# Patient Record
Sex: FEMALE | Race: WHITE | NOT HISPANIC OR LATINO | Employment: FULL TIME | URBAN - METROPOLITAN AREA
[De-identification: names, ages, dates, MRNs, and addresses within clinical notes are randomized per-mention and may not be internally consistent; named-entity substitution may affect disease eponyms.]

---

## 2020-05-05 DIAGNOSIS — Z01.84 ANTIBODY RESPONSE EXAMINATION: ICD-10-CM

## 2020-06-04 DIAGNOSIS — Z01.84 ANTIBODY RESPONSE EXAMINATION: ICD-10-CM

## 2020-07-04 DIAGNOSIS — Z01.84 ANTIBODY RESPONSE EXAMINATION: ICD-10-CM

## 2020-08-03 DIAGNOSIS — Z01.84 ANTIBODY RESPONSE EXAMINATION: ICD-10-CM

## 2020-09-02 DIAGNOSIS — Z01.84 ANTIBODY RESPONSE EXAMINATION: ICD-10-CM

## 2020-10-02 DIAGNOSIS — Z01.84 ANTIBODY RESPONSE EXAMINATION: ICD-10-CM

## 2020-11-01 DIAGNOSIS — Z01.84 ANTIBODY RESPONSE EXAMINATION: ICD-10-CM

## 2020-12-01 DIAGNOSIS — Z01.84 ANTIBODY RESPONSE EXAMINATION: ICD-10-CM

## 2022-10-24 ENCOUNTER — OCCUPATIONAL HEALTH (OUTPATIENT)
Dept: URGENT CARE | Facility: CLINIC | Age: 27
End: 2022-10-24
Payer: MEDICAID

## 2022-10-24 DIAGNOSIS — Z11.59 SCREENING FOR VIRAL DISEASE: Primary | ICD-10-CM

## 2022-10-24 LAB
CTP QC/QA: YES
SARS-COV-2 AG RESP QL IA.RAPID: POSITIVE

## 2022-10-24 PROCEDURE — 87811 SARS-COV-2 COVID19 W/OPTIC: CPT | Mod: QW,S$GLB,, | Performed by: EMERGENCY MEDICINE

## 2022-10-24 PROCEDURE — 87811 SARS CORONAVIRUS 2 ANTIGEN POCT, MANUAL READ: ICD-10-PCS | Mod: QW,S$GLB,, | Performed by: EMERGENCY MEDICINE

## 2022-10-25 PROBLEM — U07.1 COVID-19: Status: ACTIVE | Noted: 2022-10-24

## 2023-02-17 ENCOUNTER — OFFICE VISIT (OUTPATIENT)
Dept: OBSTETRICS AND GYNECOLOGY | Facility: CLINIC | Age: 28
End: 2023-02-17
Attending: OBSTETRICS & GYNECOLOGY
Payer: COMMERCIAL

## 2023-02-17 VITALS
OXYGEN SATURATION: 99 % | RESPIRATION RATE: 18 BRPM | DIASTOLIC BLOOD PRESSURE: 82 MMHG | SYSTOLIC BLOOD PRESSURE: 122 MMHG | HEART RATE: 81 BPM | WEIGHT: 165.81 LBS | BODY MASS INDEX: 30.51 KG/M2 | HEIGHT: 62 IN

## 2023-02-17 DIAGNOSIS — Z01.419 WELL WOMAN EXAM WITH ROUTINE GYNECOLOGICAL EXAM: Primary | ICD-10-CM

## 2023-02-17 DIAGNOSIS — N92.6 IRREGULAR MENSTRUAL CYCLE: ICD-10-CM

## 2023-02-17 DIAGNOSIS — Z12.39 ENCOUNTER FOR BREAST CANCER SCREENING USING NON-MAMMOGRAM MODALITY: ICD-10-CM

## 2023-02-17 DIAGNOSIS — Z12.4 CERVICAL CANCER SCREENING: ICD-10-CM

## 2023-02-17 PROCEDURE — 3008F BODY MASS INDEX DOCD: CPT | Mod: CPTII,S$GLB,, | Performed by: OBSTETRICS & GYNECOLOGY

## 2023-02-17 PROCEDURE — 3074F SYST BP LT 130 MM HG: CPT | Mod: CPTII,S$GLB,, | Performed by: OBSTETRICS & GYNECOLOGY

## 2023-02-17 PROCEDURE — 87624 HPV HI-RISK TYP POOLED RSLT: CPT | Performed by: OBSTETRICS & GYNECOLOGY

## 2023-02-17 PROCEDURE — 3079F PR MOST RECENT DIASTOLIC BLOOD PRESSURE 80-89 MM HG: ICD-10-PCS | Mod: CPTII,S$GLB,, | Performed by: OBSTETRICS & GYNECOLOGY

## 2023-02-17 PROCEDURE — 1159F MED LIST DOCD IN RCRD: CPT | Mod: CPTII,S$GLB,, | Performed by: OBSTETRICS & GYNECOLOGY

## 2023-02-17 PROCEDURE — 3074F PR MOST RECENT SYSTOLIC BLOOD PRESSURE < 130 MM HG: ICD-10-PCS | Mod: CPTII,S$GLB,, | Performed by: OBSTETRICS & GYNECOLOGY

## 2023-02-17 PROCEDURE — 88175 CYTOPATH C/V AUTO FLUID REDO: CPT | Performed by: PATHOLOGY

## 2023-02-17 PROCEDURE — 99385 PREV VISIT NEW AGE 18-39: CPT | Mod: S$GLB,,, | Performed by: OBSTETRICS & GYNECOLOGY

## 2023-02-17 PROCEDURE — 3079F DIAST BP 80-89 MM HG: CPT | Mod: CPTII,S$GLB,, | Performed by: OBSTETRICS & GYNECOLOGY

## 2023-02-17 PROCEDURE — 3008F PR BODY MASS INDEX (BMI) DOCUMENTED: ICD-10-PCS | Mod: CPTII,S$GLB,, | Performed by: OBSTETRICS & GYNECOLOGY

## 2023-02-17 PROCEDURE — 1159F PR MEDICATION LIST DOCUMENTED IN MEDICAL RECORD: ICD-10-PCS | Mod: CPTII,S$GLB,, | Performed by: OBSTETRICS & GYNECOLOGY

## 2023-02-17 PROCEDURE — 99999 PR PBB SHADOW E&M-EST. PATIENT-LVL III: CPT | Mod: PBBFAC,,, | Performed by: OBSTETRICS & GYNECOLOGY

## 2023-02-17 PROCEDURE — 99999 PR PBB SHADOW E&M-EST. PATIENT-LVL III: ICD-10-PCS | Mod: PBBFAC,,, | Performed by: OBSTETRICS & GYNECOLOGY

## 2023-02-17 PROCEDURE — 99385 PR PREVENTIVE VISIT,NEW,18-39: ICD-10-PCS | Mod: S$GLB,,, | Performed by: OBSTETRICS & GYNECOLOGY

## 2023-02-17 NOTE — PROGRESS NOTES
Subjective:    Patient ID: Xuan Hendrix is a 28 y.o. y.o. female.     Chief Complaint: Annual Well Woman Exam     History of Present Illness:  Xuan presents today for Annual Well Woman exam. She describes her menses as regular every month without intermenstrual spotting.She denies pelvic pain.  She denies breast tenderness, masses, nipple discharge. She denies difficulty with urination or bowel movements. She is sexually active. Contraception is by NFP.    Patient previously on OCPs and stopped ~ 1 year ago. She is doing NFP to prevent pregnancy at this time. Last 6 cycles have ranged from 26-38 days. She reports doing OPKs on CD10-13 with no positive but had a change in d/c on CD#14. She is interested in conceiving soon.     The following portions of the patient's history were reviewed and updated as appropriate: allergies, current medications, past family history, past medical history, past social history, past surgical history and problem list.      Menstrual History:   Patient's last menstrual period was 02/03/2023 (exact date)..     OB History    No obstetric history on file.         The following portions of the patient's history were reviewed and updated as appropriate: allergies, current medications, past family history, past medical history, past social history, past surgical history, and problem list.        ROS:     Review of Systems   Constitutional:  Negative for activity change, appetite change, chills, diaphoresis, fatigue, fever and unexpected weight change.   HENT:  Negative for mouth sores and tinnitus.    Eyes:  Negative for discharge and visual disturbance.   Respiratory:  Negative for cough, shortness of breath and wheezing.    Cardiovascular:  Negative for chest pain, palpitations and leg swelling.   Gastrointestinal:  Negative for abdominal pain, bloating, blood in stool, constipation, diarrhea, nausea and vomiting.   Endocrine: Negative for diabetes, hair loss, hot flashes,  "hyperthyroidism and hypothyroidism.   Genitourinary:  Positive for menstrual problem. Negative for dysuria, flank pain, frequency, genital sores, hematuria, urgency, vaginal bleeding, vaginal discharge, vaginal pain, postcoital bleeding and vaginal odor.   Musculoskeletal:  Negative for back pain, joint swelling and myalgias.   Integumentary:  Negative for rash, acne, breast mass, nipple discharge and breast skin changes.   Neurological:  Negative for seizures, syncope, numbness and headaches.   Hematological:  Negative for adenopathy. Does not bruise/bleed easily.   Psychiatric/Behavioral:  Negative for depression and sleep disturbance. The patient is not nervous/anxious.    Breast: Negative for mass, mastodynia, nipple discharge and skin changes    Objective:    Vital Signs:  Vitals:    02/17/23 1124   BP: 122/82   Pulse: 81   Resp: 18   SpO2: 99%   Weight: 75.2 kg (165 lb 12.8 oz)   Height: 5' 2" (1.575 m)         Physical Exam:  General:  alert, cooperative, appears stated age   Skin:  Skin color, texture, turgor normal. No rashes or lesions   HEENT:  conjunctivae/corneas clear. PERRL.   Neck: supple, trachea midline, no adenopathy or thyromegally   Respiratory:  clear to auscultation bilaterally   Heart:  regular rate and rhythm, S1, S2 normal, no murmur, click, rub or gallop   Breasts:  no discharge, erythema, or tenderness   Abdomen:  normal findings: bowel sounds normal, no masses palpable, no organomegaly and soft, non-tender   Pelvis: External genitalia: normal general appearance  Urinary system: urethral meatus normal, bladder nontender  Vaginal: normal mucosa without prolapse or lesions  Cervix: normal appearance  Uterus: normal size, shape, position  Adnexa: normal size, nontender bilaterally   Extremities: Normal ROM; no edema, no cyanosis   Neurologial: Normal strength and tone. No focal numbness or weakness. Reflexes 2+ and equal.   Psychiatric: normal mood, speech, dress, and thought processes "         Assessment:       Healthy female exam.     1. Well woman exam with routine gynecological exam    2. Encounter for breast cancer screening using non-mammogram modality    3. Cervical cancer screening    4. Irregular menstrual cycle          Plan:       Thin prep Pap smear.  HPV cotesting  CD 21 labs on 2/24     COUNSELING:  Xuan was counseled on STD pevention, use and side-effects of various contraceptive measures, A.C.O.G. Pap guidelines and recommendations for yearly pelvic exams in addition to recommendations for monthly self breast exams; to see her PCP for other health maintenance.

## 2023-02-25 ENCOUNTER — LAB VISIT (OUTPATIENT)
Dept: LAB | Facility: HOSPITAL | Age: 28
End: 2023-02-25
Attending: OBSTETRICS & GYNECOLOGY
Payer: COMMERCIAL

## 2023-02-25 DIAGNOSIS — N92.6 IRREGULAR MENSTRUAL CYCLE: ICD-10-CM

## 2023-02-25 LAB
ESTIMATED AVG GLUCOSE: 91 MG/DL (ref 68–131)
HBA1C MFR BLD: 4.8 % (ref 4–5.6)
PROGEST SERPL-MCNC: 4.8 NG/ML
PROLACTIN SERPL IA-MCNC: 21.1 NG/ML (ref 5.2–26.5)
TSH SERPL DL<=0.005 MIU/L-ACNC: 2.96 UIU/ML (ref 0.4–4)

## 2023-02-25 PROCEDURE — 36415 COLL VENOUS BLD VENIPUNCTURE: CPT | Performed by: OBSTETRICS & GYNECOLOGY

## 2023-02-25 PROCEDURE — 84443 ASSAY THYROID STIM HORMONE: CPT | Performed by: OBSTETRICS & GYNECOLOGY

## 2023-02-25 PROCEDURE — 83036 HEMOGLOBIN GLYCOSYLATED A1C: CPT | Performed by: OBSTETRICS & GYNECOLOGY

## 2023-02-25 PROCEDURE — 84144 ASSAY OF PROGESTERONE: CPT | Performed by: OBSTETRICS & GYNECOLOGY

## 2023-02-25 PROCEDURE — 84146 ASSAY OF PROLACTIN: CPT | Performed by: OBSTETRICS & GYNECOLOGY

## 2023-02-27 ENCOUNTER — PATIENT MESSAGE (OUTPATIENT)
Dept: OBSTETRICS AND GYNECOLOGY | Facility: CLINIC | Age: 28
End: 2023-02-27
Payer: COMMERCIAL

## 2023-02-27 LAB
HPV HR 12 DNA SPEC QL NAA+PROBE: POSITIVE
HPV16 AG SPEC QL: NEGATIVE
HPV18 DNA SPEC QL NAA+PROBE: NEGATIVE

## 2023-02-28 LAB
FINAL PATHOLOGIC DIAGNOSIS: ABNORMAL
Lab: ABNORMAL

## 2023-03-03 DIAGNOSIS — N97.0 ANOVULATION: Primary | ICD-10-CM

## 2023-03-03 RX ORDER — MEDROXYPROGESTERONE ACETATE 10 MG/1
10 TABLET ORAL DAILY
Qty: 10 TABLET | Refills: 0 | Status: SHIPPED | OUTPATIENT
Start: 2023-03-03 | End: 2023-05-17

## 2023-03-03 RX ORDER — LETROZOLE 2.5 MG/1
2.5 TABLET, FILM COATED ORAL DAILY
Qty: 5 TABLET | Refills: 0 | Status: SHIPPED | OUTPATIENT
Start: 2023-03-03 | End: 2023-03-08

## 2023-03-16 ENCOUNTER — PROCEDURE VISIT (OUTPATIENT)
Dept: OBSTETRICS AND GYNECOLOGY | Facility: CLINIC | Age: 28
End: 2023-03-16
Payer: COMMERCIAL

## 2023-03-16 VITALS
DIASTOLIC BLOOD PRESSURE: 78 MMHG | HEIGHT: 62 IN | BODY MASS INDEX: 30.67 KG/M2 | SYSTOLIC BLOOD PRESSURE: 124 MMHG | WEIGHT: 166.69 LBS

## 2023-03-16 DIAGNOSIS — Z23 NEED FOR HPV VACCINATION: ICD-10-CM

## 2023-03-16 DIAGNOSIS — R87.612 LGSIL ON PAP SMEAR OF CERVIX: ICD-10-CM

## 2023-03-16 DIAGNOSIS — Z01.812 ENCOUNTER FOR PREPROCEDURAL LABORATORY EXAMINATION: Primary | ICD-10-CM

## 2023-03-16 LAB
B-HCG UR QL: NEGATIVE
CTP QC/QA: YES

## 2023-03-16 PROCEDURE — 56820 COLPOSCOPY: ICD-10-PCS | Mod: S$GLB,,, | Performed by: OBSTETRICS & GYNECOLOGY

## 2023-03-16 PROCEDURE — 88305 TISSUE EXAM BY PATHOLOGIST: ICD-10-PCS | Mod: 26,,, | Performed by: PATHOLOGY

## 2023-03-16 PROCEDURE — 81025 POCT URINE PREGNANCY: ICD-10-PCS | Mod: S$GLB,,, | Performed by: OBSTETRICS & GYNECOLOGY

## 2023-03-16 PROCEDURE — 88342 IMHCHEM/IMCYTCHM 1ST ANTB: CPT | Mod: 26,,, | Performed by: PATHOLOGY

## 2023-03-16 PROCEDURE — 90651 HPV VACCINE 9-VALENT 3 DOSE IM: ICD-10-PCS | Mod: S$GLB,,, | Performed by: OBSTETRICS & GYNECOLOGY

## 2023-03-16 PROCEDURE — 56820 COLPOSCOPY VULVA: CPT | Mod: S$GLB,,, | Performed by: OBSTETRICS & GYNECOLOGY

## 2023-03-16 PROCEDURE — 90471 HPV VACCINE 9-VALENT 3 DOSE IM: ICD-10-PCS | Mod: S$GLB,,, | Performed by: OBSTETRICS & GYNECOLOGY

## 2023-03-16 PROCEDURE — 90651 9VHPV VACCINE 2/3 DOSE IM: CPT | Mod: S$GLB,,, | Performed by: OBSTETRICS & GYNECOLOGY

## 2023-03-16 PROCEDURE — 88342 IMHCHEM/IMCYTCHM 1ST ANTB: CPT | Performed by: PATHOLOGY

## 2023-03-16 PROCEDURE — 81025 URINE PREGNANCY TEST: CPT | Mod: S$GLB,,, | Performed by: OBSTETRICS & GYNECOLOGY

## 2023-03-16 PROCEDURE — 88342 CHG IMMUNOCYTOCHEMISTRY: ICD-10-PCS | Mod: 26,,, | Performed by: PATHOLOGY

## 2023-03-16 PROCEDURE — 88305 TISSUE EXAM BY PATHOLOGIST: CPT | Mod: 26,,, | Performed by: PATHOLOGY

## 2023-03-16 PROCEDURE — 88305 TISSUE EXAM BY PATHOLOGIST: CPT | Mod: 59 | Performed by: PATHOLOGY

## 2023-03-16 PROCEDURE — 90471 IMMUNIZATION ADMIN: CPT | Mod: S$GLB,,, | Performed by: OBSTETRICS & GYNECOLOGY

## 2023-03-16 NOTE — PROCEDURES
Colposcopy    Date/Time: 3/16/2023 2:00 PM  Performed by: Estella Pino MD  Authorized by: Estella Pino MD     Consent Done?:  Yes (Verbal) and Yes (Written)  Assistants?: Yes      Colposcopy Site:  Cervix, Vagina and Vulva  Position:  Supine  Acrowhite Lesion: No    Atypical Vessels: No    Transformation Zone Adequate?: No    Biopsy?: Yes         Location:  Cervix ((2 00 and 5 00))  ECC Performed?: Yes     Patient tolerated the procedure well with no immediate complications.

## 2023-03-27 LAB
FINAL PATHOLOGIC DIAGNOSIS: NORMAL
GROSS: NORMAL
Lab: NORMAL

## 2023-05-01 ENCOUNTER — CLINICAL SUPPORT (OUTPATIENT)
Dept: FAMILY MEDICINE | Facility: CLINIC | Age: 28
End: 2023-05-01
Payer: COMMERCIAL

## 2023-05-01 DIAGNOSIS — N97.0 ANOVULATION: ICD-10-CM

## 2023-05-01 LAB — PROGEST SERPL-MCNC: 16.1 NG/ML

## 2023-05-01 PROCEDURE — 84144 ASSAY OF PROGESTERONE: CPT | Performed by: OBSTETRICS & GYNECOLOGY

## 2023-05-03 ENCOUNTER — PATIENT MESSAGE (OUTPATIENT)
Dept: OBSTETRICS AND GYNECOLOGY | Facility: CLINIC | Age: 28
End: 2023-05-03
Payer: COMMERCIAL

## 2023-05-03 ENCOUNTER — OFFICE VISIT (OUTPATIENT)
Dept: INTERNAL MEDICINE | Facility: CLINIC | Age: 28
End: 2023-05-03
Payer: COMMERCIAL

## 2023-05-03 VITALS
SYSTOLIC BLOOD PRESSURE: 122 MMHG | WEIGHT: 168.19 LBS | DIASTOLIC BLOOD PRESSURE: 70 MMHG | HEART RATE: 83 BPM | HEIGHT: 62 IN | OXYGEN SATURATION: 100 % | RESPIRATION RATE: 16 BRPM | BODY MASS INDEX: 30.95 KG/M2

## 2023-05-03 DIAGNOSIS — Z32.01 POSITIVE URINE PREGNANCY TEST: Primary | ICD-10-CM

## 2023-05-03 DIAGNOSIS — Z11.4 ENCOUNTER FOR SCREENING FOR HIV: ICD-10-CM

## 2023-05-03 DIAGNOSIS — F90.2 ADHD (ATTENTION DEFICIT HYPERACTIVITY DISORDER), COMBINED TYPE: ICD-10-CM

## 2023-05-03 DIAGNOSIS — Z76.89 ENCOUNTER TO ESTABLISH CARE: Primary | ICD-10-CM

## 2023-05-03 DIAGNOSIS — E66.09 CLASS 1 OBESITY DUE TO EXCESS CALORIES WITHOUT SERIOUS COMORBIDITY WITH BODY MASS INDEX (BMI) OF 30.0 TO 30.9 IN ADULT: ICD-10-CM

## 2023-05-03 DIAGNOSIS — Z11.59 NEED FOR HEPATITIS C SCREENING TEST: ICD-10-CM

## 2023-05-03 PROCEDURE — 1160F PR REVIEW ALL MEDS BY PRESCRIBER/CLIN PHARMACIST DOCUMENTED: ICD-10-PCS | Mod: CPTII,S$GLB,, | Performed by: INTERNAL MEDICINE

## 2023-05-03 PROCEDURE — 1159F MED LIST DOCD IN RCRD: CPT | Mod: CPTII,S$GLB,, | Performed by: INTERNAL MEDICINE

## 2023-05-03 PROCEDURE — 3078F DIAST BP <80 MM HG: CPT | Mod: CPTII,S$GLB,, | Performed by: INTERNAL MEDICINE

## 2023-05-03 PROCEDURE — 99999 PR PBB SHADOW E&M-EST. PATIENT-LVL III: ICD-10-PCS | Mod: PBBFAC,,, | Performed by: INTERNAL MEDICINE

## 2023-05-03 PROCEDURE — 1160F RVW MEDS BY RX/DR IN RCRD: CPT | Mod: CPTII,S$GLB,, | Performed by: INTERNAL MEDICINE

## 2023-05-03 PROCEDURE — 3044F HG A1C LEVEL LT 7.0%: CPT | Mod: CPTII,S$GLB,, | Performed by: INTERNAL MEDICINE

## 2023-05-03 PROCEDURE — 3074F SYST BP LT 130 MM HG: CPT | Mod: CPTII,S$GLB,, | Performed by: INTERNAL MEDICINE

## 2023-05-03 PROCEDURE — 3044F PR MOST RECENT HEMOGLOBIN A1C LEVEL <7.0%: ICD-10-PCS | Mod: CPTII,S$GLB,, | Performed by: INTERNAL MEDICINE

## 2023-05-03 PROCEDURE — 1159F PR MEDICATION LIST DOCUMENTED IN MEDICAL RECORD: ICD-10-PCS | Mod: CPTII,S$GLB,, | Performed by: INTERNAL MEDICINE

## 2023-05-03 PROCEDURE — 99204 PR OFFICE/OUTPT VISIT, NEW, LEVL IV, 45-59 MIN: ICD-10-PCS | Mod: S$GLB,,, | Performed by: INTERNAL MEDICINE

## 2023-05-03 PROCEDURE — 99999 PR PBB SHADOW E&M-EST. PATIENT-LVL III: CPT | Mod: PBBFAC,,, | Performed by: INTERNAL MEDICINE

## 2023-05-03 PROCEDURE — 3078F PR MOST RECENT DIASTOLIC BLOOD PRESSURE < 80 MM HG: ICD-10-PCS | Mod: CPTII,S$GLB,, | Performed by: INTERNAL MEDICINE

## 2023-05-03 PROCEDURE — 3074F PR MOST RECENT SYSTOLIC BLOOD PRESSURE < 130 MM HG: ICD-10-PCS | Mod: CPTII,S$GLB,, | Performed by: INTERNAL MEDICINE

## 2023-05-03 PROCEDURE — 99204 OFFICE O/P NEW MOD 45 MIN: CPT | Mod: S$GLB,,, | Performed by: INTERNAL MEDICINE

## 2023-05-03 PROCEDURE — 3008F BODY MASS INDEX DOCD: CPT | Mod: CPTII,S$GLB,, | Performed by: INTERNAL MEDICINE

## 2023-05-03 PROCEDURE — 3008F PR BODY MASS INDEX (BMI) DOCUMENTED: ICD-10-PCS | Mod: CPTII,S$GLB,, | Performed by: INTERNAL MEDICINE

## 2023-05-03 NOTE — PROGRESS NOTES
Subjective:       Patient ID: Xuan Quintanilla is a 28 y.o. female.    Chief Complaint: Establish Care      HPI:  Patient is new to clinic and presents to establish care. She has ADHD, anxiety/OCD. She is recently employed in [employer] system and needs to establish care with PCP here.     She established with Dr. Ventura here for routine GYN here. She had abnormal PAP; Colposcopy recently completed.  She is considering pregnancy and had labs with Dr. Ventura as well following her progesterone.     ADHD and anxiety: she is on Zoloft 25mg and Adderall 10mg---script written for BID but she and her prior PCP had discussed and she is taking once daily as afternoon dose was causing insomnia. Scripts labs about 2 months. Sometimes skips weekend doses as well. She has never tried stopping her stimulant completely but does note when she skips weekend her concentration is very poor.     Tobacco use: denies  EtOH use: no daily use  Illicit drug use: denies          Past Medical History:   Diagnosis Date    ADHD (attention deficit hyperactivity disorder), combined type 03/2018    Jasper Goldberg 81    Adolescent idiopathic scoliosis of thoracolumbar region     Agoraphobia with panic attacks     Anxiety hyperventilation     Chronic seasonal allergic rhinitis     COVID-19 10/24/2022    Hx of infectious mononucleosis     Obsessive compulsive disorder     PCR DNA positive for HSV1        Family History   Problem Relation Age of Onset    Hypertension Mother     Miscarriages / Stillbirths Mother     Obesity Mother     Anxiety disorder Mother     Metabolic syndrome Mother     ADD / ADHD Mother     Polycystic ovary syndrome Mother     Endometriosis Mother     Blindness Father         right eye    Heart attack Maternal Uncle     Stroke Maternal Uncle     Heart disease Maternal Grandmother     Arthritis Maternal Grandmother     Cancer Maternal Grandfather     Heart attack Maternal Grandfather     Breast cancer Neg Hx     Colon cancer Neg Hx      Diabetes Neg Hx        Social History     Socioeconomic History    Marital status:      Spouse name: Alf Keller    Number of children: 0    Highest education level: Some college, no degree   Occupational History    Occupation: Student at [employer], 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   HENT:  Negative for congestion, ear pain, hearing loss, rhinorrhea, sore throat and tinnitus.    Eyes:  Negative for pain, redness and visual disturbance.   Respiratory:  Negative for cough, shortness of breath and wheezing.    Cardiovascular:  Negative for chest pain, palpitations and leg swelling.   Gastrointestinal:  Negative for abdominal pain, blood in stool, constipation, diarrhea, nausea and vomiting.   Genitourinary:  Negative for dysuria, frequency, pelvic pain and urgency.   Musculoskeletal:  Negative for back pain, joint swelling and neck pain.   Skin:  Negative for color change, rash and wound.   Neurological:  Negative for dizziness, tremors, weakness, light-headedness and headaches.       Objective:      Physical Exam  Vitals reviewed.   Constitutional:       General: She is not in acute distress.     Appearance: She is well-developed.   HENT:      Head: Normocephalic and atraumatic.      Right Ear: External ear normal.      Left Ear: External ear normal.      Nose: Nose normal.   Eyes:      General:         Right eye: No discharge.         Left eye: No discharge.      Extraocular Movements: Extraocular movements intact.      Conjunctiva/sclera: Conjunctivae normal.   Neck:       Thyroid: No thyromegaly.   Cardiovascular:      Rate and Rhythm: Normal rate and regular rhythm.   Pulmonary:      Effort: Pulmonary effort is normal.   Skin:     General: Skin is warm and dry.   Neurological:      Mental Status: She is alert and oriented to person, place, and time. Mental status is at baseline.   Psychiatric:         Behavior: Behavior normal.         Thought Content: Thought content normal.       Assessment:       1. Encounter to establish care    2. ADHD (attention deficit hyperactivity disorder), combined type    3. Class 1 obesity due to excess calories without serious comorbidity with body mass index (BMI) of 30.0 to 30.9 in adult    4. Encounter for screening for HIV    5. Need for hepatitis C screening test        Plan:       1. Encounter to establish care  Meds reconciled  Problem list reviewed and updated    2. ADHD (attention deficit hyperactivity disorder), combined type  Chronic stable  Cont adderall and zoloft same dose    Agree to monthly refill, no early refills. We discussed her current dosing and #60 tabs should last 2 months on refills  agrees to 1 pharmacy- [facility]  I will monitor   Agrees only 1 provider can prescribed controlled substances. Understands if other providers prescribe for acute issue she must let me know  Agrees to random UDS and understands if ever positive, including THC will no longer prescribe  Understands I reserve the risk to stop prescribing controlled substances at any time    Pregnancy counseling regarding stimulant medications done today as well. We will have to stop during pregnancy. OK to cont zoloft in pregnancy. I spent > 15 mins on counseling today     3. Class 1 obesity due to excess calories without serious comorbidity with body mass index (BMI) of 30.0 to 30.9 in adult  Diet, exercise, weight loss  Update routine age recommended screening labs  -     CBC Auto Differential; Future; Expected date: 05/03/2023  -     Comprehensive Metabolic Panel;  Future; Expected date: 05/03/2023  -     Lipid Panel; Future; Expected date: 05/03/2023    4. Encounter for screening for HIV  Agreeable to screening. 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 Need for hepatitis C screening test  Agreeable to screening. 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

## 2023-05-03 NOTE — TELEPHONE ENCOUNTER
Pt received progesterone results on portal and is s/p femara 2.5 mg. Results copied and pasted below.     Component Ref Range & Units 2 d ago 2 mo ago   Progesterone See Text ng/mL 16.1  4.8 CM    Comment: Female Reference Ranges:   Follicular phase...............<0.3 ng/mL   Luteal phase...................1.2-15.9 ng/mL   Post-menopausal................<0.2 ng/mL   Pregnant, 1st Trimester........2.8-147.3 ng/mL   Pregnant, 2nd Trimester........22.5-95.3 ng/mL   Pregnant, 3rd Trimester........27.9-243 ng/mL   Male Reference range...........<0.2 ng/mL    Resulting Agency  [facility]              Specimen Collected: 05/01/23 11:37 Last Resulted: 05/01/23 23:01

## 2023-05-12 ENCOUNTER — CLINICAL SUPPORT (OUTPATIENT)
Dept: INTERNAL MEDICINE | Facility: CLINIC | Age: 28
End: 2023-05-12
Payer: COMMERCIAL

## 2023-05-12 DIAGNOSIS — E66.09 CLASS 1 OBESITY DUE TO EXCESS CALORIES WITHOUT SERIOUS COMORBIDITY WITH BODY MASS INDEX (BMI) OF 30.0 TO 30.9 IN ADULT: ICD-10-CM

## 2023-05-12 DIAGNOSIS — Z32.01 POSITIVE URINE PREGNANCY TEST: ICD-10-CM

## 2023-05-12 DIAGNOSIS — Z11.4 ENCOUNTER FOR SCREENING FOR HIV: ICD-10-CM

## 2023-05-12 DIAGNOSIS — Z11.59 NEED FOR HEPATITIS C SCREENING TEST: ICD-10-CM

## 2023-05-12 LAB
ALBUMIN SERPL BCP-MCNC: 4.2 G/DL (ref 3.5–5.2)
ALP SERPL-CCNC: 50 U/L (ref 55–135)
ALT SERPL W/O P-5'-P-CCNC: 19 U/L (ref 10–44)
ANION GAP SERPL CALC-SCNC: 6 MMOL/L (ref 8–16)
AST SERPL-CCNC: 23 U/L (ref 10–40)
BASOPHILS # BLD AUTO: 0.09 K/UL (ref 0–0.2)
BASOPHILS NFR BLD: 1.1 % (ref 0–1.9)
BILIRUB SERPL-MCNC: 0.3 MG/DL (ref 0.1–1)
BUN SERPL-MCNC: 9 MG/DL (ref 6–20)
CALCIUM SERPL-MCNC: 9.9 MG/DL (ref 8.7–10.5)
CHLORIDE SERPL-SCNC: 105 MMOL/L (ref 95–110)
CHOLEST SERPL-MCNC: 214 MG/DL (ref 120–199)
CHOLEST/HDLC SERPL: 2.7 {RATIO} (ref 2–5)
CO2 SERPL-SCNC: 25 MMOL/L (ref 23–29)
CREAT SERPL-MCNC: 0.8 MG/DL (ref 0.5–1.4)
DIFFERENTIAL METHOD: ABNORMAL
EOSINOPHIL # BLD AUTO: 0.2 K/UL (ref 0–0.5)
EOSINOPHIL NFR BLD: 2.8 % (ref 0–8)
ERYTHROCYTE [DISTWIDTH] IN BLOOD BY AUTOMATED COUNT: 12.4 % (ref 11.5–14.5)
EST. GFR  (NO RACE VARIABLE): >60 ML/MIN/1.73 M^2
GLUCOSE SERPL-MCNC: 84 MG/DL (ref 70–110)
HCG INTACT+B SERPL-ACNC: 1263 MIU/ML
HCT VFR BLD AUTO: 41.5 % (ref 37–48.5)
HCV AB SERPL QL IA: NORMAL
HDLC SERPL-MCNC: 80 MG/DL (ref 40–75)
HDLC SERPL: 37.4 % (ref 20–50)
HGB BLD-MCNC: 13.2 G/DL (ref 12–16)
HIV 1+2 AB+HIV1 P24 AG SERPL QL IA: NORMAL
IMM GRANULOCYTES # BLD AUTO: 0.03 K/UL (ref 0–0.04)
IMM GRANULOCYTES NFR BLD AUTO: 0.4 % (ref 0–0.5)
LDLC SERPL CALC-MCNC: 123.6 MG/DL (ref 63–159)
LYMPHOCYTES # BLD AUTO: 2.4 K/UL (ref 1–4.8)
LYMPHOCYTES NFR BLD: 28.5 % (ref 18–48)
MCH RBC QN AUTO: 28.6 PG (ref 27–31)
MCHC RBC AUTO-ENTMCNC: 31.8 G/DL (ref 32–36)
MCV RBC AUTO: 90 FL (ref 82–98)
MONOCYTES # BLD AUTO: 0.5 K/UL (ref 0.3–1)
MONOCYTES NFR BLD: 6.5 % (ref 4–15)
NEUTROPHILS # BLD AUTO: 5.1 K/UL (ref 1.8–7.7)
NEUTROPHILS NFR BLD: 60.7 % (ref 38–73)
NONHDLC SERPL-MCNC: 134 MG/DL
NRBC BLD-RTO: 0 /100 WBC
PLATELET # BLD AUTO: 259 K/UL (ref 150–450)
PMV BLD AUTO: 10.8 FL (ref 9.2–12.9)
POTASSIUM SERPL-SCNC: 3.6 MMOL/L (ref 3.5–5.1)
PROT SERPL-MCNC: 7.6 G/DL (ref 6–8.4)
RBC # BLD AUTO: 4.61 M/UL (ref 4–5.4)
SODIUM SERPL-SCNC: 136 MMOL/L (ref 136–145)
TRIGL SERPL-MCNC: 52 MG/DL (ref 30–150)
WBC # BLD AUTO: 8.32 K/UL (ref 3.9–12.7)

## 2023-05-12 PROCEDURE — 80061 LIPID PANEL: CPT | Performed by: INTERNAL MEDICINE

## 2023-05-12 PROCEDURE — 86803 HEPATITIS C AB TEST: CPT | Performed by: INTERNAL MEDICINE

## 2023-05-12 PROCEDURE — 36415 PR COLLECTION VENOUS BLOOD,VENIPUNCTURE: ICD-10-PCS | Mod: S$GLB,,, | Performed by: INTERNAL MEDICINE

## 2023-05-12 PROCEDURE — 80053 COMPREHEN METABOLIC PANEL: CPT | Performed by: INTERNAL MEDICINE

## 2023-05-12 PROCEDURE — 36415 COLL VENOUS BLD VENIPUNCTURE: CPT | Performed by: INTERNAL MEDICINE

## 2023-05-12 PROCEDURE — 84702 CHORIONIC GONADOTROPIN TEST: CPT | Performed by: OBSTETRICS & GYNECOLOGY

## 2023-05-12 PROCEDURE — 85025 COMPLETE CBC W/AUTO DIFF WBC: CPT | Performed by: INTERNAL MEDICINE

## 2023-05-12 PROCEDURE — 87389 HIV-1 AG W/HIV-1&-2 AB AG IA: CPT | Performed by: INTERNAL MEDICINE

## 2023-05-12 PROCEDURE — 36415 COLL VENOUS BLD VENIPUNCTURE: CPT | Mod: S$GLB,,, | Performed by: INTERNAL MEDICINE

## 2023-05-15 ENCOUNTER — PATIENT MESSAGE (OUTPATIENT)
Dept: OBSTETRICS AND GYNECOLOGY | Facility: CLINIC | Age: 28
End: 2023-05-15
Payer: COMMERCIAL

## 2023-05-16 ENCOUNTER — LAB VISIT (OUTPATIENT)
Dept: LAB | Facility: HOSPITAL | Age: 28
End: 2023-05-16
Attending: OBSTETRICS & GYNECOLOGY
Payer: COMMERCIAL

## 2023-05-16 DIAGNOSIS — Z32.01 POSITIVE URINE PREGNANCY TEST: ICD-10-CM

## 2023-05-16 DIAGNOSIS — Z32.01 POSITIVE PREGNANCY TEST: ICD-10-CM

## 2023-05-16 DIAGNOSIS — Z32.01 POSITIVE PREGNANCY TEST: Primary | ICD-10-CM

## 2023-05-16 LAB
ABO + RH BLD: NORMAL
BLD GP AB SCN CELLS X3 SERPL QL: NORMAL
HBV SURFACE AG SERPL QL IA: NORMAL
HCG INTACT+B SERPL-ACNC: 9992 MIU/ML
SPECIMEN OUTDATE: NORMAL

## 2023-05-16 PROCEDURE — 36415 COLL VENOUS BLD VENIPUNCTURE: CPT | Performed by: OBSTETRICS & GYNECOLOGY

## 2023-05-16 PROCEDURE — 87340 HEPATITIS B SURFACE AG IA: CPT | Performed by: OBSTETRICS & GYNECOLOGY

## 2023-05-16 PROCEDURE — 86762 RUBELLA ANTIBODY: CPT | Performed by: OBSTETRICS & GYNECOLOGY

## 2023-05-16 PROCEDURE — 84702 CHORIONIC GONADOTROPIN TEST: CPT | Performed by: OBSTETRICS & GYNECOLOGY

## 2023-05-16 PROCEDURE — 81220 CFTR GENE COM VARIANTS: CPT | Performed by: OBSTETRICS & GYNECOLOGY

## 2023-05-16 PROCEDURE — 86592 SYPHILIS TEST NON-TREP QUAL: CPT | Performed by: OBSTETRICS & GYNECOLOGY

## 2023-05-16 PROCEDURE — 86900 BLOOD TYPING SEROLOGIC ABO: CPT | Performed by: OBSTETRICS & GYNECOLOGY

## 2023-05-17 ENCOUNTER — OFFICE VISIT (OUTPATIENT)
Dept: OBSTETRICS AND GYNECOLOGY | Facility: CLINIC | Age: 28
End: 2023-05-17
Payer: COMMERCIAL

## 2023-05-17 VITALS
HEIGHT: 62 IN | DIASTOLIC BLOOD PRESSURE: 72 MMHG | SYSTOLIC BLOOD PRESSURE: 114 MMHG | RESPIRATION RATE: 18 BRPM | HEART RATE: 91 BPM | BODY MASS INDEX: 31.34 KG/M2 | WEIGHT: 170.31 LBS | OXYGEN SATURATION: 98 %

## 2023-05-17 DIAGNOSIS — Z32.01 POSITIVE PREGNANCY TEST: ICD-10-CM

## 2023-05-17 DIAGNOSIS — N91.2 AMENORRHEA: Primary | ICD-10-CM

## 2023-05-17 DIAGNOSIS — Z11.3 SCREEN FOR STD (SEXUALLY TRANSMITTED DISEASE): ICD-10-CM

## 2023-05-17 DIAGNOSIS — Z32.00 POSSIBLE PREGNANCY: ICD-10-CM

## 2023-05-17 LAB
B-HCG UR QL: POSITIVE
C TRACH DNA SPEC QL NAA+PROBE: NOT DETECTED
CTP QC/QA: YES
N GONORRHOEA DNA SPEC QL NAA+PROBE: NOT DETECTED
RPR SER QL: NORMAL
RUBV IGG SER-ACNC: 29.1 IU/ML
RUBV IGG SER-IMP: REACTIVE

## 2023-05-17 PROCEDURE — 3044F HG A1C LEVEL LT 7.0%: CPT | Mod: CPTII,S$GLB,, | Performed by: OBSTETRICS & GYNECOLOGY

## 2023-05-17 PROCEDURE — 3075F PR MOST RECENT SYSTOLIC BLOOD PRESS GE 130-139MM HG: ICD-10-PCS | Mod: CPTII,S$GLB,, | Performed by: OBSTETRICS & GYNECOLOGY

## 2023-05-17 PROCEDURE — 1159F MED LIST DOCD IN RCRD: CPT | Mod: CPTII,S$GLB,, | Performed by: OBSTETRICS & GYNECOLOGY

## 2023-05-17 PROCEDURE — 87591 N.GONORRHOEAE DNA AMP PROB: CPT | Performed by: OBSTETRICS & GYNECOLOGY

## 2023-05-17 PROCEDURE — 3078F PR MOST RECENT DIASTOLIC BLOOD PRESSURE < 80 MM HG: ICD-10-PCS | Mod: CPTII,S$GLB,, | Performed by: OBSTETRICS & GYNECOLOGY

## 2023-05-17 PROCEDURE — 81025 POCT URINE PREGNANCY: ICD-10-PCS | Mod: S$GLB,,, | Performed by: OBSTETRICS & GYNECOLOGY

## 2023-05-17 PROCEDURE — 3078F DIAST BP <80 MM HG: CPT | Mod: CPTII,S$GLB,, | Performed by: OBSTETRICS & GYNECOLOGY

## 2023-05-17 PROCEDURE — 3075F SYST BP GE 130 - 139MM HG: CPT | Mod: CPTII,S$GLB,, | Performed by: OBSTETRICS & GYNECOLOGY

## 2023-05-17 PROCEDURE — 81025 URINE PREGNANCY TEST: CPT | Mod: S$GLB,,, | Performed by: OBSTETRICS & GYNECOLOGY

## 2023-05-17 PROCEDURE — 3044F PR MOST RECENT HEMOGLOBIN A1C LEVEL <7.0%: ICD-10-PCS | Mod: CPTII,S$GLB,, | Performed by: OBSTETRICS & GYNECOLOGY

## 2023-05-17 PROCEDURE — 99999 PR PBB SHADOW E&M-EST. PATIENT-LVL III: ICD-10-PCS | Mod: PBBFAC,,, | Performed by: OBSTETRICS & GYNECOLOGY

## 2023-05-17 PROCEDURE — 99214 OFFICE O/P EST MOD 30 MIN: CPT | Mod: 25,S$GLB,, | Performed by: OBSTETRICS & GYNECOLOGY

## 2023-05-17 PROCEDURE — 99999 PR PBB SHADOW E&M-EST. PATIENT-LVL III: CPT | Mod: PBBFAC,,, | Performed by: OBSTETRICS & GYNECOLOGY

## 2023-05-17 PROCEDURE — 3008F PR BODY MASS INDEX (BMI) DOCUMENTED: ICD-10-PCS | Mod: CPTII,S$GLB,, | Performed by: OBSTETRICS & GYNECOLOGY

## 2023-05-17 PROCEDURE — 1159F PR MEDICATION LIST DOCUMENTED IN MEDICAL RECORD: ICD-10-PCS | Mod: CPTII,S$GLB,, | Performed by: OBSTETRICS & GYNECOLOGY

## 2023-05-17 PROCEDURE — 99214 PR OFFICE/OUTPT VISIT, EST, LEVL IV, 30-39 MIN: ICD-10-PCS | Mod: 25,S$GLB,, | Performed by: OBSTETRICS & GYNECOLOGY

## 2023-05-17 PROCEDURE — 3008F BODY MASS INDEX DOCD: CPT | Mod: CPTII,S$GLB,, | Performed by: OBSTETRICS & GYNECOLOGY

## 2023-05-17 NOTE — PROGRESS NOTES
Subjective:   Patient ID: Xuan Quintanilla is a 28 y.o. y.o. female.     Chief Complaint: Missed Menses       History of Present Illness:    Xuan presents today complaining of amenorrhea. Patient's last menstrual period was 04/10/2023 (exact date).. Prior menstrual cycles have been regular. She reports positive home pregnancy test. UPT is positive.       Past Medical History:   Diagnosis Date    ADHD (attention deficit hyperactivity disorder), combined type 03/2018    Jasper Goldberg 81    Adolescent idiopathic scoliosis of thoracolumbar region     Agoraphobia with panic attacks     Anxiety hyperventilation     Chronic seasonal allergic rhinitis     COVID-19 10/24/2022    Hx of infectious mononucleosis     Obsessive compulsive disorder     PCR DNA positive for HSV1      History reviewed. No pertinent surgical history.  Social History     Socioeconomic History    Marital status:      Spouse name: Alf Keller    Number of children: 0    Highest education level: Some college, no degree   Occupational History    Occupation: Student at [employer]      Comment: working as Tech in ER    Occupation: Former manager AE 5+ years   Tobacco Use    Smoking status: Never    Smokeless tobacco: Never   Substance and Sexual Activity    Alcohol use: Not Currently     Comment: once every 2 years    Drug use: Yes     Types: Amphetamines    Sexual activity: Yes     Partners: Male     Birth control/protection: None     Family History   Problem Relation Age of Onset    Hypertension Mother     Miscarriages / Stillbirths Mother     Obesity Mother     Anxiety disorder Mother     Metabolic syndrome Mother     ADD / ADHD Mother     Polycystic ovary syndrome Mother     Endometriosis Mother     Blindness Father         right eye    Heart attack Maternal Uncle     Stroke Maternal Uncle     Heart disease Maternal Grandmother     Arthritis Maternal Grandmother     Cancer Maternal Grandfather     Heart attack Maternal Grandfather      Breast cancer Neg Hx     Colon cancer Neg Hx     Diabetes Neg Hx      OB History    Para Term  AB Living   1             SAB IAB Ectopic Multiple Live Births                  # Outcome Date GA Lbr Holden/2nd Weight Sex Delivery Anes PTL Lv   1 Current                  ROS:   Constitutional: Negative for chills, fever and weight loss.   HENT: Negative for congestion, ear discharge, hearing loss and nosebleeds.   Eyes: Negative for blurred vision and double vision.   Respiratory: Negative for cough, hemoptysis, sputum production and shortness of breath.   Cardiovascular: Negative for chest pain, palpitations and orthopnea.   Gastrointestinal: Negative for abdominal pain, heartburn, nausea and vomiting.   Genitourinary: Negative for dysuria, frequency, hematuria and urgency.   Musculoskeletal: Negative for back pain, myalgias and neck pain.   Skin: Negative for itching and rash.   Neurological: Negative for dizziness, tingling, tremors and headaches.   Endo/Heme/Allergies: Negative for environmental allergies and polydipsia. Does not bruise/bleed easily.   Psychiatric/Behavioral: Negative for depression, hallucinations and substance abuse. The patient is not nervous/anxious.       Objective:   Vital Signs:  Vitals:    23 1042   BP: 138/78   Pulse: 91   Resp: 18         Physical Exam:  General:  alert, cooperative, appears stated age   Skin:  Skin color, texture, turgor normal. No rashes or lesions   HEENT:  conjunctivae/corneas clear. PERRL.   Neck: supple, trachea midline, no adenopathy or thyromegally   Respiratory:  clear to auscultation bilaterally   Heart:  regular rate and rhythm, S1, S2 normal, no murmur, click, rub or gallop   Breasts:  no discharge, erythema, or tenderness   Abdomen:  normal findings: bowel sounds normal, no masses palpable, no organomegaly and soft, non-tender   Pelvis: External genitalia: normal general appearance  Urinary system: urethral meatus normal, bladder  nontender  Vaginal: normal mucosa without prolapse or lesions  Cervix: normal appearance  Uterus: normal size, shape, position  Adnexa: normal size, nontender bilaterally   Extremities: Normal ROM; no edema, no cyanosis   Neurologial: Normal strength and tone. No focal numbness or weakness. Reflexes 2+ and equal.   Psychiatric: normal mood, speech, dress, and thought processes     Assessment:      1. Amenorrhea    2. Screen for STD (sexually transmitted disease)    3. Possible pregnancy    4. Positive pregnancy test          Plan:      Amenorrhea  -     US OB/GYN Procedure ([facility]) - Extended List - Future; Future    Screen for STD (sexually transmitted disease)  -     C. trachomatis/N. gonorrhoeae by AMP DNA [facility]; Cervicovaginal    Possible pregnancy  -     POCT Urine Pregnancy    Positive pregnancy test    Other orders  -     Cancel: Liquid-Based Pap Smear, Screening  -     Cancel: HPV High Risk Genotypes, PCR      GC/CT  Labs done yesterday  Dating scan next week  RTC in 4 weeks      Patient was counseled today on proper weight gain based on the [place] [employer]'s recommendations based on her pre-pregnancy weight. Discussed foods to avoid in pregnancy (i.e. sushi, fish that are high in mercury, deli meat, and unpasteurized cheeses). Discussed prenatal vitamin options (i.e. stool softener, DHA). She was also counseled on safe, healthy behavior as well as medications safe in pregnancy.

## 2023-05-19 LAB — CFTR MUT ANL BLD/T: NORMAL

## 2023-05-26 ENCOUNTER — PROCEDURE VISIT (OUTPATIENT)
Dept: OBSTETRICS AND GYNECOLOGY | Facility: CLINIC | Age: 28
End: 2023-05-26
Payer: COMMERCIAL

## 2023-05-26 DIAGNOSIS — N91.2 AMENORRHEA: ICD-10-CM

## 2023-05-26 PROCEDURE — 76801 OB US < 14 WKS SINGLE FETUS: CPT | Mod: S$GLB,,, | Performed by: OBSTETRICS & GYNECOLOGY

## 2023-05-26 PROCEDURE — 76801 US OB/GYN EXTENDED PROCEDURE (VIEWPOINT): ICD-10-PCS | Mod: S$GLB,,, | Performed by: OBSTETRICS & GYNECOLOGY

## 2023-06-16 ENCOUNTER — INITIAL PRENATAL (OUTPATIENT)
Dept: OBSTETRICS AND GYNECOLOGY | Facility: CLINIC | Age: 28
End: 2023-06-16
Payer: COMMERCIAL

## 2023-06-16 VITALS
BODY MASS INDEX: 31.53 KG/M2 | WEIGHT: 172.38 LBS | HEART RATE: 89 BPM | SYSTOLIC BLOOD PRESSURE: 104 MMHG | DIASTOLIC BLOOD PRESSURE: 78 MMHG

## 2023-06-16 DIAGNOSIS — Z3A.09 9 WEEKS GESTATION OF PREGNANCY: ICD-10-CM

## 2023-06-16 DIAGNOSIS — Z34.01 ENCOUNTER FOR SUPERVISION OF NORMAL FIRST PREGNANCY IN FIRST TRIMESTER: Primary | ICD-10-CM

## 2023-06-16 PROCEDURE — 99999 PR PBB SHADOW E&M-EST. PATIENT-LVL II: CPT | Mod: PBBFAC,,, | Performed by: OBSTETRICS & GYNECOLOGY

## 2023-06-16 PROCEDURE — 0502F PR SUBSEQUENT PRENATAL CARE: ICD-10-PCS | Mod: CPTII,S$GLB,, | Performed by: OBSTETRICS & GYNECOLOGY

## 2023-06-16 PROCEDURE — 0502F SUBSEQUENT PRENATAL CARE: CPT | Mod: CPTII,S$GLB,, | Performed by: OBSTETRICS & GYNECOLOGY

## 2023-06-16 PROCEDURE — 99999 PR PBB SHADOW E&M-EST. PATIENT-LVL II: ICD-10-PCS | Mod: PBBFAC,,, | Performed by: OBSTETRICS & GYNECOLOGY

## 2023-06-16 NOTE — PROGRESS NOTES
Patient with no obstetrical complaints. Reviewed prenatal labs with patient. Reviewed dating criteria. Discussed proper nutrition and weight gain in pregnancy. No vaginal bleeding or cramping noted. SAB precautions discussed. Patient to RTC in 4 weeks.     cffDNA ordered.     Vitals signs, FHTs, urine dip, and PE findings documented, reviewed and available in OB flow chart.       I spent a total of 20 minutes on the day of the visit.This includes face to face time and non-face to face time preparing to see the patient (eg, review of tests), Obtaining and/or reviewing separately obtained history, Documenting clinical information in the electronic or other health record, Independently interpreting resultsand communicating results to the patient/family/caregiver, or Care coordination.     Initial ob packet supplied to patient. Contents supplied and discussed include medications safe in pregnancy, pregnancy A to Z, class schedule, pregnancy checklist, car seat safety, and handout on skin to skin and breastfeeding. Coeffective lang card supplied and discussed.

## 2023-07-06 ENCOUNTER — LAB VISIT (OUTPATIENT)
Dept: LAB | Facility: HOSPITAL | Age: 28
End: 2023-07-06
Attending: INTERNAL MEDICINE
Payer: COMMERCIAL

## 2023-07-06 DIAGNOSIS — Z3A.09 9 WEEKS GESTATION OF PREGNANCY: ICD-10-CM

## 2023-07-06 DIAGNOSIS — Z34.01 ENCOUNTER FOR SUPERVISION OF NORMAL FIRST PREGNANCY IN FIRST TRIMESTER: ICD-10-CM

## 2023-07-06 PROCEDURE — 36415 COLL VENOUS BLD VENIPUNCTURE: CPT | Performed by: OBSTETRICS & GYNECOLOGY

## 2023-07-12 ENCOUNTER — PATIENT MESSAGE (OUTPATIENT)
Dept: ADMINISTRATIVE | Facility: OTHER | Age: 28
End: 2023-07-12
Payer: COMMERCIAL

## 2023-07-12 ENCOUNTER — ROUTINE PRENATAL (OUTPATIENT)
Dept: OBSTETRICS AND GYNECOLOGY | Facility: CLINIC | Age: 28
End: 2023-07-12
Payer: COMMERCIAL

## 2023-07-12 VITALS
HEART RATE: 90 BPM | SYSTOLIC BLOOD PRESSURE: 124 MMHG | BODY MASS INDEX: 32.59 KG/M2 | DIASTOLIC BLOOD PRESSURE: 76 MMHG | WEIGHT: 178.19 LBS

## 2023-07-12 DIAGNOSIS — Z34.01 ENCOUNTER FOR SUPERVISION OF NORMAL FIRST PREGNANCY IN FIRST TRIMESTER: Primary | ICD-10-CM

## 2023-07-12 DIAGNOSIS — Z3A.13 13 WEEKS GESTATION OF PREGNANCY: ICD-10-CM

## 2023-07-12 PROCEDURE — 99999 PR PBB SHADOW E&M-EST. PATIENT-LVL II: ICD-10-PCS | Mod: PBBFAC,,, | Performed by: OBSTETRICS & GYNECOLOGY

## 2023-07-12 PROCEDURE — 99999 PR PBB SHADOW E&M-EST. PATIENT-LVL II: CPT | Mod: PBBFAC,,, | Performed by: OBSTETRICS & GYNECOLOGY

## 2023-07-12 PROCEDURE — 0502F SUBSEQUENT PRENATAL CARE: CPT | Mod: CPTII,S$GLB,, | Performed by: OBSTETRICS & GYNECOLOGY

## 2023-07-12 PROCEDURE — 0502F PR SUBSEQUENT PRENATAL CARE: ICD-10-PCS | Mod: CPTII,S$GLB,, | Performed by: OBSTETRICS & GYNECOLOGY

## 2023-07-12 NOTE — PROGRESS NOTES
Patient doing well. No vaginal bleeding or cramping noted. Discussed the need for an anatomy scan between 18-20 weeks. Discussed genetic testing/screening; cffDNA pending RTC in 4 weeks.    Vitals signs, FHTs, urine dip, and PE findings documented, reviewed and available in OB flow chart.       I spent a total of 20 minutes on the day of the visit.This includes face to face time and non-face to face time preparing to see the patient (eg, review of tests), Obtaining and/or reviewing separately obtained history, Documenting clinical information in the electronic or other health record, Independently interpreting resultsand communicating results to the patient/family/caregiver, or Care coordination.      Coffective counseling sheet Get Ready discussed with mother. Reinforced avoiding induction of labor unless medically indicated as well as comfort measures during labor.  Encouraged mother to download Coffective mobile lang if she has not already done so. Mother verbalizes understanding.

## 2023-07-31 NOTE — TELEPHONE ENCOUNTER
15w6d ob reports stopping Zoloft when she found out she was pregnant. She now feels like she would need to be on something in place of Zoloft. Patient would like something sent in to pharmacy that she can take while pregnant. Patient aware Dr Ventura is out of office today and will return tomorrow.

## 2023-08-01 ENCOUNTER — PATIENT MESSAGE (OUTPATIENT)
Dept: OTHER | Facility: OTHER | Age: 28
End: 2023-08-01
Payer: COMMERCIAL

## 2023-08-01 RX ORDER — BUSPIRONE HYDROCHLORIDE 5 MG/1
5 TABLET ORAL 2 TIMES DAILY
Qty: 60 TABLET | Refills: 1 | Status: SHIPPED | OUTPATIENT
Start: 2023-08-01 | End: 2023-08-01 | Stop reason: SDUPTHER

## 2023-08-01 RX ORDER — BUSPIRONE HYDROCHLORIDE 5 MG/1
5 TABLET ORAL 2 TIMES DAILY
Qty: 60 TABLET | Refills: 1 | Status: ON HOLD | OUTPATIENT
Start: 2023-08-01 | End: 2024-01-15 | Stop reason: HOSPADM

## 2023-08-01 NOTE — TELEPHONE ENCOUNTER
Pt made aware and voiced understanding.  She is requesting Rx be resent to [facility].

## 2023-08-11 ENCOUNTER — ROUTINE PRENATAL (OUTPATIENT)
Dept: OBSTETRICS AND GYNECOLOGY | Facility: CLINIC | Age: 28
End: 2023-08-11
Payer: COMMERCIAL

## 2023-08-11 VITALS
SYSTOLIC BLOOD PRESSURE: 94 MMHG | DIASTOLIC BLOOD PRESSURE: 62 MMHG | HEART RATE: 83 BPM | BODY MASS INDEX: 33.1 KG/M2 | WEIGHT: 181 LBS

## 2023-08-11 DIAGNOSIS — Z34.02 ENCOUNTER FOR SUPERVISION OF NORMAL FIRST PREGNANCY IN SECOND TRIMESTER: Primary | ICD-10-CM

## 2023-08-11 DIAGNOSIS — Z3A.17 17 WEEKS GESTATION OF PREGNANCY: ICD-10-CM

## 2023-08-11 DIAGNOSIS — Z36.3 ENCOUNTER FOR ROUTINE SCREENING FOR MALFORMATION USING ULTRASONICS: ICD-10-CM

## 2023-08-11 PROBLEM — U07.1 COVID-19: Status: RESOLVED | Noted: 2022-10-24 | Resolved: 2023-08-11

## 2023-08-11 PROCEDURE — 99999 PR PBB SHADOW E&M-EST. PATIENT-LVL II: ICD-10-PCS | Mod: PBBFAC,,, | Performed by: OBSTETRICS & GYNECOLOGY

## 2023-08-11 PROCEDURE — 0502F SUBSEQUENT PRENATAL CARE: CPT | Mod: CPTII,S$GLB,, | Performed by: OBSTETRICS & GYNECOLOGY

## 2023-08-11 PROCEDURE — 99999 PR PBB SHADOW E&M-EST. PATIENT-LVL II: CPT | Mod: PBBFAC,,, | Performed by: OBSTETRICS & GYNECOLOGY

## 2023-08-11 PROCEDURE — 0502F PR SUBSEQUENT PRENATAL CARE: ICD-10-PCS | Mod: CPTII,S$GLB,, | Performed by: OBSTETRICS & GYNECOLOGY

## 2023-08-11 NOTE — PROGRESS NOTES
Patient with no complaints. Denies vaginal bleeding or cramping. Anatomy scan ordered. RTC in 4 weeks    Vitals signs, FHTs, urine dip, and PE findings documented, reviewed and available in OB flow chart.       I spent a total of 20 minutes on the day of the visit.This includes face to face time and non-face to face time preparing to see the patient (eg, review of tests), Obtaining and/or reviewing separately obtained history, Documenting clinical information in the electronic or other health record, Independently interpreting resultsand communicating results to the patient/family/caregiver, or Care coordination.     Coffective counseling sheet Fall In Love discussed with mother. Reinforced immediate skin to skin, the magic first hour, importance of the first feeding and delaying routine procedures. Encouraged mother to download Coffective mobile lang if she has not already done so. Mother verbalizes understanding.

## 2023-08-24 ENCOUNTER — CLINICAL SUPPORT (OUTPATIENT)
Dept: INTERNAL MEDICINE | Facility: CLINIC | Age: 28
End: 2023-08-24
Payer: COMMERCIAL

## 2023-08-24 DIAGNOSIS — R05.9 COUGH, UNSPECIFIED TYPE: Primary | ICD-10-CM

## 2023-08-24 LAB
CTP QC/QA: YES
CTP QC/QA: YES
S PYO RRNA THROAT QL PROBE: NEGATIVE
SARS-COV-2 RDRP RESP QL NAA+PROBE: NEGATIVE

## 2023-08-24 PROCEDURE — 87635: ICD-10-PCS | Mod: QW,S$GLB,, | Performed by: FAMILY MEDICINE

## 2023-08-24 PROCEDURE — 87880 POCT RAPID STREP A: ICD-10-PCS | Mod: QW,S$GLB,, | Performed by: FAMILY MEDICINE

## 2023-08-24 PROCEDURE — 87880 STREP A ASSAY W/OPTIC: CPT | Mod: QW,S$GLB,, | Performed by: FAMILY MEDICINE

## 2023-08-24 PROCEDURE — 87635 SARS-COV-2 COVID-19 AMP PRB: CPT | Mod: QW,S$GLB,, | Performed by: FAMILY MEDICINE

## 2023-08-29 ENCOUNTER — PATIENT MESSAGE (OUTPATIENT)
Dept: OTHER | Facility: OTHER | Age: 28
End: 2023-08-29
Payer: COMMERCIAL

## 2023-08-30 ENCOUNTER — ROUTINE PRENATAL (OUTPATIENT)
Dept: OBSTETRICS AND GYNECOLOGY | Facility: CLINIC | Age: 28
End: 2023-08-30
Payer: COMMERCIAL

## 2023-08-30 VITALS
BODY MASS INDEX: 33.75 KG/M2 | WEIGHT: 184.5 LBS | DIASTOLIC BLOOD PRESSURE: 72 MMHG | HEART RATE: 90 BPM | SYSTOLIC BLOOD PRESSURE: 118 MMHG

## 2023-08-30 DIAGNOSIS — Z34.02 ENCOUNTER FOR SUPERVISION OF NORMAL FIRST PREGNANCY IN SECOND TRIMESTER: Primary | ICD-10-CM

## 2023-08-30 DIAGNOSIS — Z3A.20 20 WEEKS GESTATION OF PREGNANCY: ICD-10-CM

## 2023-08-30 PROCEDURE — 0502F PR SUBSEQUENT PRENATAL CARE: ICD-10-PCS | Mod: CPTII,S$GLB,, | Performed by: OBSTETRICS & GYNECOLOGY

## 2023-08-30 PROCEDURE — 99999 PR PBB SHADOW E&M-EST. PATIENT-LVL II: ICD-10-PCS | Mod: PBBFAC,,, | Performed by: OBSTETRICS & GYNECOLOGY

## 2023-08-30 PROCEDURE — 0502F SUBSEQUENT PRENATAL CARE: CPT | Mod: CPTII,S$GLB,, | Performed by: OBSTETRICS & GYNECOLOGY

## 2023-08-30 PROCEDURE — 99999 PR PBB SHADOW E&M-EST. PATIENT-LVL II: CPT | Mod: PBBFAC,,, | Performed by: OBSTETRICS & GYNECOLOGY

## 2023-08-30 NOTE — PROGRESS NOTES
Patient with no complaints. Denies vaginal bleeding or cramping.  Anatomy scan scheduled. RTC in 4 weeks    Vitals signs, FHTs, urine dip, and PE findings documented, reviewed and available in OB flow chart.       I spent a total of 20 minutes on the day of the visit.This includes face to face time and non-face to face time preparing to see the patient (eg, review of tests), Obtaining and/or reviewing separately obtained history, Documenting clinical information in the electronic or other health record, Independently interpreting resultsand communicating results to the patient/family/caregiver, or Care coordination.     Coffective counseling sheet Fall In Love discussed with mother. Reinforced immediate skin to skin, the magic first hour, importance of the first feeding and delaying routine procedures. Encouraged mother to download Coffective mobile lang if she has not already done so. Mother verbalizes understanding.

## 2023-09-15 ENCOUNTER — PROCEDURE VISIT (OUTPATIENT)
Dept: OBSTETRICS AND GYNECOLOGY | Facility: CLINIC | Age: 28
End: 2023-09-15
Payer: COMMERCIAL

## 2023-09-15 DIAGNOSIS — Z36.3 ENCOUNTER FOR ROUTINE SCREENING FOR MALFORMATION USING ULTRASONICS: ICD-10-CM

## 2023-09-15 PROCEDURE — 76805 US OB/GYN EXTENDED PROCEDURE (VIEWPOINT): ICD-10-PCS | Mod: S$GLB,,, | Performed by: OBSTETRICS & GYNECOLOGY

## 2023-09-15 PROCEDURE — 76805 OB US >/= 14 WKS SNGL FETUS: CPT | Mod: S$GLB,,, | Performed by: OBSTETRICS & GYNECOLOGY

## 2023-09-26 ENCOUNTER — PATIENT MESSAGE (OUTPATIENT)
Dept: OTHER | Facility: OTHER | Age: 28
End: 2023-09-26
Payer: COMMERCIAL

## 2023-09-29 ENCOUNTER — ROUTINE PRENATAL (OUTPATIENT)
Dept: OBSTETRICS AND GYNECOLOGY | Facility: CLINIC | Age: 28
End: 2023-09-29
Payer: COMMERCIAL

## 2023-09-29 VITALS
WEIGHT: 191.38 LBS | BODY MASS INDEX: 35 KG/M2 | SYSTOLIC BLOOD PRESSURE: 114 MMHG | DIASTOLIC BLOOD PRESSURE: 74 MMHG | HEART RATE: 88 BPM

## 2023-09-29 DIAGNOSIS — Z34.02 ENCOUNTER FOR SUPERVISION OF NORMAL FIRST PREGNANCY IN SECOND TRIMESTER: Primary | ICD-10-CM

## 2023-09-29 DIAGNOSIS — Z3A.24 24 WEEKS GESTATION OF PREGNANCY: ICD-10-CM

## 2023-09-29 PROCEDURE — 0502F SUBSEQUENT PRENATAL CARE: CPT | Mod: CPTII,S$GLB,, | Performed by: OBSTETRICS & GYNECOLOGY

## 2023-09-29 PROCEDURE — 0502F PR SUBSEQUENT PRENATAL CARE: ICD-10-PCS | Mod: CPTII,S$GLB,, | Performed by: OBSTETRICS & GYNECOLOGY

## 2023-09-29 PROCEDURE — 99999 PR PBB SHADOW E&M-EST. PATIENT-LVL II: CPT | Mod: PBBFAC,,, | Performed by: OBSTETRICS & GYNECOLOGY

## 2023-09-29 PROCEDURE — 99999 PR PBB SHADOW E&M-EST. PATIENT-LVL II: ICD-10-PCS | Mod: PBBFAC,,, | Performed by: OBSTETRICS & GYNECOLOGY

## 2023-09-29 NOTE — PROGRESS NOTES
Patient with no complaints. Denies vaginal bleeding or cramping.  Good FM. Discussed with patient having glucose testing for gestational diabetes preformed between 24-28 weeks. RTC in 2 weeks.     Vitals signs, FHTs, urine dip, and PE findings documented, reviewed and available in OB flow chart.       I spent a total of 20 minutes on the day of the visit.This includes face to face time and non-face to face time preparing to see the patient (eg, review of tests), Obtaining and/or reviewing separately obtained history, Documenting clinical information in the electronic or other health record, Independently interpreting resultsand communicating results to the patient/family/caregiver, or Care coordination.     Coffective counseling sheet Learn Your Baby and Protect Breastfeeding discussed with mother. Instructed regarding feeding cues and methods to calm baby. Encouraged mother to download Coffective mobile lang if she has not already done so.  Mother verbalized understanding.

## 2023-10-10 ENCOUNTER — PATIENT MESSAGE (OUTPATIENT)
Dept: OTHER | Facility: OTHER | Age: 28
End: 2023-10-10
Payer: COMMERCIAL

## 2023-10-11 ENCOUNTER — ROUTINE PRENATAL (OUTPATIENT)
Dept: OBSTETRICS AND GYNECOLOGY | Facility: CLINIC | Age: 28
End: 2023-10-11
Payer: COMMERCIAL

## 2023-10-11 ENCOUNTER — LAB VISIT (OUTPATIENT)
Dept: LAB | Facility: HOSPITAL | Age: 28
End: 2023-10-11
Attending: OBSTETRICS & GYNECOLOGY
Payer: COMMERCIAL

## 2023-10-11 VITALS
DIASTOLIC BLOOD PRESSURE: 70 MMHG | SYSTOLIC BLOOD PRESSURE: 116 MMHG | HEART RATE: 96 BPM | WEIGHT: 193.25 LBS | BODY MASS INDEX: 35.34 KG/M2

## 2023-10-11 DIAGNOSIS — Z3A.26 26 WEEKS GESTATION OF PREGNANCY: ICD-10-CM

## 2023-10-11 DIAGNOSIS — Z34.02 ENCOUNTER FOR SUPERVISION OF NORMAL FIRST PREGNANCY IN SECOND TRIMESTER: ICD-10-CM

## 2023-10-11 DIAGNOSIS — Z34.02 ENCOUNTER FOR SUPERVISION OF NORMAL FIRST PREGNANCY IN SECOND TRIMESTER: Primary | ICD-10-CM

## 2023-10-11 LAB
BASOPHILS # BLD AUTO: 0.07 K/UL (ref 0–0.2)
BASOPHILS NFR BLD: 0.5 % (ref 0–1.9)
DIFFERENTIAL METHOD: ABNORMAL
EOSINOPHIL # BLD AUTO: 0.1 K/UL (ref 0–0.5)
EOSINOPHIL NFR BLD: 1 % (ref 0–8)
ERYTHROCYTE [DISTWIDTH] IN BLOOD BY AUTOMATED COUNT: 13.2 % (ref 11.5–14.5)
GLUCOSE SERPL-MCNC: 98 MG/DL (ref 70–140)
HCT VFR BLD AUTO: 37.1 % (ref 37–48.5)
HGB BLD-MCNC: 12.4 G/DL (ref 12–16)
IMM GRANULOCYTES # BLD AUTO: 0.24 K/UL (ref 0–0.04)
IMM GRANULOCYTES NFR BLD AUTO: 1.8 % (ref 0–0.5)
LYMPHOCYTES # BLD AUTO: 2 K/UL (ref 1–4.8)
LYMPHOCYTES NFR BLD: 14.6 % (ref 18–48)
MCH RBC QN AUTO: 30.4 PG (ref 27–31)
MCHC RBC AUTO-ENTMCNC: 33.4 G/DL (ref 32–36)
MCV RBC AUTO: 91 FL (ref 82–98)
MONOCYTES # BLD AUTO: 0.9 K/UL (ref 0.3–1)
MONOCYTES NFR BLD: 6.4 % (ref 4–15)
NEUTROPHILS # BLD AUTO: 10.1 K/UL (ref 1.8–7.7)
NEUTROPHILS NFR BLD: 75.7 % (ref 38–73)
NRBC BLD-RTO: 0 /100 WBC
PLATELET # BLD AUTO: 171 K/UL (ref 150–450)
PMV BLD AUTO: 9.7 FL (ref 9.2–12.9)
RBC # BLD AUTO: 4.08 M/UL (ref 4–5.4)
WBC # BLD AUTO: 13.35 K/UL (ref 3.9–12.7)

## 2023-10-11 PROCEDURE — 99999 PR PBB SHADOW E&M-EST. PATIENT-LVL II: ICD-10-PCS | Mod: PBBFAC,,, | Performed by: OBSTETRICS & GYNECOLOGY

## 2023-10-11 PROCEDURE — 99999 PR PBB SHADOW E&M-EST. PATIENT-LVL II: CPT | Mod: PBBFAC,,, | Performed by: OBSTETRICS & GYNECOLOGY

## 2023-10-11 PROCEDURE — 0502F SUBSEQUENT PRENATAL CARE: CPT | Mod: CPTII,S$GLB,, | Performed by: OBSTETRICS & GYNECOLOGY

## 2023-10-11 PROCEDURE — 82950 GLUCOSE TEST: CPT | Performed by: OBSTETRICS & GYNECOLOGY

## 2023-10-11 PROCEDURE — 0502F PR SUBSEQUENT PRENATAL CARE: ICD-10-PCS | Mod: CPTII,S$GLB,, | Performed by: OBSTETRICS & GYNECOLOGY

## 2023-10-11 PROCEDURE — 85025 COMPLETE CBC W/AUTO DIFF WBC: CPT | Performed by: OBSTETRICS & GYNECOLOGY

## 2023-10-11 PROCEDURE — 36415 COLL VENOUS BLD VENIPUNCTURE: CPT | Performed by: OBSTETRICS & GYNECOLOGY

## 2023-10-11 NOTE — PROGRESS NOTES
Patient with no complaints. Denies vaginal bleeding or contractions. Good FM. GTT/CBC ordered today.   labor precautions discussed with patient. RTC in 2 weeks.     Vitals signs, FHTs, urine dip, and PE findings documented, reviewed and available in OB flow chart.       I spent a total of 20 minutes on the day of the visit.This includes face to face time and non-face to face time preparing to see the patient (eg, review of tests), Obtaining and/or reviewing separately obtained history, Documenting clinical information in the electronic or other health record, Independently interpreting resultsand communicating results to the patient/family/caregiver, or Care coordination.     Coffective counseling sheet Nourish discussed with mother. Reinforced basic breastfeeding position and latch as well as proper hand expression technique and avoidance of artificial nipples and formula unless medically indicated. Encouraged mother to download Coffective mobile lang if she has not already done so.  Mother verbalizes understanding.

## 2023-10-17 ENCOUNTER — CLINICAL SUPPORT (OUTPATIENT)
Dept: OBSTETRICS AND GYNECOLOGY | Facility: CLINIC | Age: 28
End: 2023-10-17
Attending: OBSTETRICS & GYNECOLOGY
Payer: COMMERCIAL

## 2023-10-17 ENCOUNTER — CLINICAL SUPPORT (OUTPATIENT)
Dept: FAMILY MEDICINE | Facility: CLINIC | Age: 28
End: 2023-10-17
Payer: COMMERCIAL

## 2023-10-17 ENCOUNTER — TELEPHONE (OUTPATIENT)
Dept: FAMILY MEDICINE | Facility: CLINIC | Age: 28
End: 2023-10-17
Payer: COMMERCIAL

## 2023-10-17 DIAGNOSIS — O26.892 ABDOMINAL PAIN DURING PREGNANCY IN SECOND TRIMESTER: Primary | ICD-10-CM

## 2023-10-17 DIAGNOSIS — R82.90 URINE ABNORMALITY: Primary | ICD-10-CM

## 2023-10-17 DIAGNOSIS — R10.9 ABDOMINAL PAIN DURING PREGNANCY IN SECOND TRIMESTER: Primary | ICD-10-CM

## 2023-10-17 LAB
BILIRUB SERPL-MCNC: NORMAL MG/DL
BLOOD URINE, POC: NORMAL
CLARITY, POC UA: NORMAL
COLOR, POC UA: YELLOW
GLUCOSE UR QL STRIP: NORMAL
KETONES UR QL STRIP: NORMAL
LEUKOCYTE ESTERASE URINE, POC: NORMAL
NITRITE, POC UA: NORMAL
PH, POC UA: 7
PROTEIN, POC: NORMAL
SPECIFIC GRAVITY, POC UA: NORMAL
UROBILINOGEN, POC UA: 0.2

## 2023-10-17 PROCEDURE — 59025 FETAL NON-STRESS TEST: CPT | Mod: S$GLB,,, | Performed by: OBSTETRICS & GYNECOLOGY

## 2023-10-17 PROCEDURE — 87086 URINE CULTURE/COLONY COUNT: CPT | Performed by: OBSTETRICS & GYNECOLOGY

## 2023-10-17 PROCEDURE — 59025 PR FETAL 2N-STRESS TEST: ICD-10-PCS | Mod: S$GLB,,, | Performed by: OBSTETRICS & GYNECOLOGY

## 2023-10-17 PROCEDURE — 81002 URINALYSIS NONAUTO W/O SCOPE: CPT | Mod: S$GLB,,, | Performed by: INTERNAL MEDICINE

## 2023-10-17 PROCEDURE — 81002 POCT URINE DIPSTICK WITHOUT MICROSCOPE: ICD-10-PCS | Mod: S$GLB,,, | Performed by: INTERNAL MEDICINE

## 2023-10-17 NOTE — PROCEDURES
Procedures   FETAL ASSESSMENT REPORT    RE: Xuan Quintanilla  MRN:  [id number]  :  1995  AGE:  28 y.o.    Date:  10/17/2023    REFERRAL PHYSICIAN: Dr. Abel Wynn    Allergies: Patient has no known allergies.    Xuan is a 28 y.o.  at 27w0d gestational age here today for a NST.    2024, by Ultrasound    MEDICATIONS AT TIME OF TEST:    Current Outpatient Medications   Medication Sig Dispense Refill    busPIRone (BUSPAR) 5 MG Tab Take 1 tablet (5 mg total) by mouth 2 (two) times daily. 60 tablet 1    prenatal 105-iron-folic ac-dha 30 mg iron- 1.4 mg-300 mg Cmpk Take by mouth.       No current facility-administered medications for this visit.       Indication: rule out uterine contractions    Interpretation:  135 BPM baseline    Variability:  Good {> 6 bpm)    Accelerations:  Reactive for gestational age    Decelerations:  none    [place]: no contractions, mild irritability    Assessment: reactive    Plan:  NST reactive      Abel Wynn MD  10/17/2023; 1:07 PM

## 2023-10-19 LAB — BACTERIA UR CULT: NO GROWTH

## 2023-10-24 ENCOUNTER — TELEPHONE (OUTPATIENT)
Dept: OBSTETRICS AND GYNECOLOGY | Facility: CLINIC | Age: 28
End: 2023-10-24
Payer: COMMERCIAL

## 2023-10-24 ENCOUNTER — PATIENT MESSAGE (OUTPATIENT)
Dept: OTHER | Facility: OTHER | Age: 28
End: 2023-10-24
Payer: COMMERCIAL

## 2023-10-25 ENCOUNTER — ROUTINE PRENATAL (OUTPATIENT)
Dept: OBSTETRICS AND GYNECOLOGY | Facility: CLINIC | Age: 28
End: 2023-10-25
Payer: COMMERCIAL

## 2023-10-25 VITALS
SYSTOLIC BLOOD PRESSURE: 114 MMHG | HEART RATE: 96 BPM | BODY MASS INDEX: 35.77 KG/M2 | WEIGHT: 195.56 LBS | DIASTOLIC BLOOD PRESSURE: 62 MMHG

## 2023-10-25 DIAGNOSIS — O26.899 PELVIC PAIN IN PREGNANCY: ICD-10-CM

## 2023-10-25 DIAGNOSIS — R10.2 PELVIC PAIN IN PREGNANCY: ICD-10-CM

## 2023-10-25 DIAGNOSIS — Z34.03 ENCOUNTER FOR SUPERVISION OF NORMAL FIRST PREGNANCY IN THIRD TRIMESTER: Primary | ICD-10-CM

## 2023-10-25 DIAGNOSIS — Z3A.28 28 WEEKS GESTATION OF PREGNANCY: ICD-10-CM

## 2023-10-25 DIAGNOSIS — Z23 NEED FOR TDAP VACCINATION: ICD-10-CM

## 2023-10-25 PROCEDURE — 0502F SUBSEQUENT PRENATAL CARE: CPT | Mod: CPTII,S$GLB,, | Performed by: OBSTETRICS & GYNECOLOGY

## 2023-10-25 PROCEDURE — 90715 TDAP VACCINE 7 YRS/> IM: CPT | Mod: S$GLB,,, | Performed by: OBSTETRICS & GYNECOLOGY

## 2023-10-25 PROCEDURE — 90471 IMMUNIZATION ADMIN: CPT | Mod: S$GLB,,, | Performed by: OBSTETRICS & GYNECOLOGY

## 2023-10-25 PROCEDURE — 99999 PR PBB SHADOW E&M-EST. PATIENT-LVL III: CPT | Mod: PBBFAC,,, | Performed by: OBSTETRICS & GYNECOLOGY

## 2023-10-25 PROCEDURE — 90715 TDAP VACCINE GREATER THAN OR EQUAL TO 7YO IM: ICD-10-PCS | Mod: S$GLB,,, | Performed by: OBSTETRICS & GYNECOLOGY

## 2023-10-25 PROCEDURE — 99999 PR PBB SHADOW E&M-EST. PATIENT-LVL III: ICD-10-PCS | Mod: PBBFAC,,, | Performed by: OBSTETRICS & GYNECOLOGY

## 2023-10-25 PROCEDURE — 90471 TDAP VACCINE GREATER THAN OR EQUAL TO 7YO IM: ICD-10-PCS | Mod: S$GLB,,, | Performed by: OBSTETRICS & GYNECOLOGY

## 2023-10-25 PROCEDURE — 0502F PR SUBSEQUENT PRENATAL CARE: ICD-10-PCS | Mod: CPTII,S$GLB,, | Performed by: OBSTETRICS & GYNECOLOGY

## 2023-10-25 NOTE — PROGRESS NOTES
Patient with no complaints. Denies vaginal bleeding or contractions. Good FM. Tdap discussed and ordered today.  Discussed fetal kick count instructions with patient to monitor fetal movement. RTC in 2 weeks.    Vitals signs, FHTs, urine dip, and PE findings documented, reviewed and available in OB flow chart.       I spent a total of 20 minutes on the day of the visit.This includes face to face time and non-face to face time preparing to see the patient (eg, review of tests), Obtaining and/or reviewing separately obtained history, Documenting clinical information in the electronic or other health record, Independently interpreting resultsand communicating results to the patient/family/caregiver, or Care coordination.     Coffective counseling sheet Keep Baby Close discussed with mother. Reinforced rooming in practices, continued skin to skin, and quiet hours as requested by mother.  Encouraged mother to download Coffective mobile lang if she has not already done so. Mother verbalizes understanding.

## 2023-10-27 ENCOUNTER — CLINICAL SUPPORT (OUTPATIENT)
Dept: REHABILITATION | Facility: HOSPITAL | Age: 28
End: 2023-10-27
Attending: OBSTETRICS & GYNECOLOGY
Payer: COMMERCIAL

## 2023-10-27 DIAGNOSIS — R27.8 COORDINATION ABNORMAL: ICD-10-CM

## 2023-10-27 DIAGNOSIS — O26.899 PELVIC PAIN IN PREGNANCY: ICD-10-CM

## 2023-10-27 DIAGNOSIS — M62.838 MUSCLE SPASM: Primary | ICD-10-CM

## 2023-10-27 DIAGNOSIS — R10.2 PELVIC PAIN IN PREGNANCY: ICD-10-CM

## 2023-10-27 PROCEDURE — 97140 MANUAL THERAPY 1/> REGIONS: CPT | Mod: PN | Performed by: PHYSICAL THERAPIST

## 2023-10-27 PROCEDURE — 97161 PT EVAL LOW COMPLEX 20 MIN: CPT | Mod: PN | Performed by: PHYSICAL THERAPIST

## 2023-10-27 NOTE — PLAN OF CARE
[facility]   Physical Therapy Initial Evaluation     Date: 10/27/2023   Name: Xuan Quintanilla  Clinic Number: [id number]    Therapy Diagnosis:   Encounter Diagnoses   Name Primary?    Pelvic pain in pregnancy     Muscle spasm Yes    Coordination abnormal      Physician: Estella Pino,*    Physician Orders: PT Eval and Treat PF PT  Medical Diagnosis from Referral: O26.899,R10.2 (ICD-10-CM) - Pelvic pain in pregnancy   Evaluation Date: 10/27/2023  Authorization Period Expiration: 10/24/24  Plan of Care Expiration: 1/19/2024  Progress Note Due: 11/24/2023  Visit # 1/12 Visits authorized: 1/ 1   FOTO: 1/3    Precautions: Standard, pregnancy     Time In: 1:03 PM   Time Out: 2:06 PM   Total Appointment Time (timed & untimed codes): 63 minutes    HISTORY      Xuan is a 28 y.o. female evaluated on 10/27/2023    Physician:  Estella Pino,*   Diagnosis:   Encounter Diagnoses   Name Primary?    Pelvic pain in pregnancy     Muscle spasm Yes    Coordination abnormal       Treatment ordered: Physical Therapy  Medical History:   Past Medical History:   Diagnosis Date    ADHD (attention deficit hyperactivity disorder), combined type 03/2018    Jasper Goldberg 81    Adolescent idiopathic scoliosis of thoracolumbar region     Agoraphobia with panic attacks     Anxiety hyperventilation     Chronic seasonal allergic rhinitis     COVID-19 10/24/2022    Hx of infectious mononucleosis     Obsessive compulsive disorder     PCR DNA positive for HSV1       Surgical History:   No past surgical history on file.   Medications:   Current Outpatient Medications   Medication Sig    busPIRone (BUSPAR) 5 MG Tab Take 1 tablet (5 mg total) by mouth 2 (two) times daily.    prenatal 105-iron-folic ac-dha 30 mg iron- 1.4 mg-300 mg Cmpk Take by mouth.     No current facility-administered medications for this visit.       Allergies:   Review of patient's allergies indicates:  No Known Allergies      Precautions: universal    OB/GYN History:  first pregnancy     Bladder/Bowel History: urinary incontinence, mild constipation, denies straining, burning at the pubic symphysis with urination which happened 2xs since pregnancy (most recent was about 2 weeks ago)       SUBJECTIVE     Date of onset: had back pain prior to pregnancy, started about the age of 16    History of current complaint: patient ist 28 weeks pregnant with her first child. She saw Dr. Ventura on Wednesday. She asked her MD about physical therapist. States that she has always had issues with her back near her coccyx. She will have pain in the abdomen with coughing and sneezing and getting up at night. Has had some mild urinary leakage. States that she was on dance team in high school and was always stretching and moving. Once she stopped, she noticed her back pain. She states that she never has had to take medication due to back pain. Has gained about 20lbs with her pregnancy. Reports right sacroiliac pain when laying supine.     Patient's goals for therapy: birth prep, improve back pain     Pain: Patient reports 2-3/10, with 0 being the lowest and 10 being the highest. - low back/coccyx pain    Activities that cause symptoms: with cough/sneeze/laugh/yell, sexual activity, prolonged sitting, and prolonged standing    Previous treatment included none    Sexually active? Yes - about 3 weeks go she had significant pain with intercourse     Frequency of urination:   Daytime: 6-7 times            Nighttime: 2-3 times    Difficulty initiating urine stream: No  Urine stream: strong  Complete emptying: Yes  Bladder leakage: Yes - very minimal  Frequency of incidents: 2xs  Amount leaked (urine): drops    Frequency of bowel movements: once every 2 days, needs more fiber  Difficulty initiating BM: No  Quality/Shape of BM: [place] Stool Chart 4  Colon leakage: No  Frequency of incidents:  none   Amount leaked (bowels): none  Form of protection: liner  Number  of pads required in 24 hours: none    Types of fluid intake: 80-100oz of water a day, limited caffeine and soda  Diet: admits to a poor diet  Current exercise: started walking 2-3 times a week, 20-30 min     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    OBJECTIVE     ORTHO SCREEN  Posture: WNL in sitting, in standing right ilium is elevated  Pelvic alignment: right ilium is elevated, increased lumbar lordosis, right sacroiliac pain, anterior rotation of the right ilium     ABDOMINALS  Abdominal strength: TA: doming with getting off of bed      TREATMENT    Ali received the following manual therapy techniques: to develop flexibility for 10 minutes including: Performed muscle energy with right hip extension and left hip flexion. Provided with home exercise program for lower extremity ROM.    Education: instructed on general anatomy/physiology of urinary/bowel system; discussed plan of care with patient and parent/guardian; instructed in benefits/risks of treatment; instructed in alternative methods of treatment; instructed in risks of refusing treatment; patient a parent agreed to treatment plan.     Also educated in: anatomy/physiology of pelvic floor, posture/body mechanices, fluid intake/dietary modifications, and behavior modifications    ASSESSMENT      This is a 28 y.o. female referred to outpatient physical therapy and presents with a medical diagnosis of O26.899,R10.2 (ICD-10-CM) - Pelvic pain in pregnancy . Patient will benefit from skilled physical therapy to improve core stability and awareness, improve pelvic floor coordination and awareness, improve coccyx and sacroiliac pain, improve posture at her work station, and improve quality of life. .    Educational/Spiritual/Cultural needs: none  Abuse/Neglect: no signs  Nutritional Status: WDWN   Fall Risk: patient is not a fall risk    Pt's spiritual, cultural and educational needs considered  and pt agreeable to plan of care and goals as stated below:     Medical Necessity is demonstrated by the following:  History  Co-morbidities and personal factors that may impact the plan of care [x] LOW: no personal factors / co-morbidities  [] MODERATE: 1-2 personal factors / co-morbidities  [] HIGH: 3+ personal factors / co-morbidities    Moderate / High Support Documentation:   Co-morbidities affecting plan of care: none    Personal Factors:   no deficits     Examination  Body Structures and Functions, activity limitations and participation restrictions that may impact the plan of care [x] LOW: addressing 1-2 elements  [] MODERATE: 3+ elements  [] HIGH: 4+ elements (please support below)    Moderate / High Support Documentation:      Clinical Presentation [x] LOW: stable  [] MODERATE: Evolving  [] HIGH: Unstable     Decision Making/ Complexity Score: low           PLAN    Frequency: 1x per week  Duration: 12 weeks    Short Term Goals: 4 weeks   Patient will report stool consistency of 3-5 on the [place] Stool chart.  Patient will be independent with pelvic floor relaxation to improve bowel evacuation.   Patient will be able to demonstrate improved pelvic floor relaxation and contraction on sEMG.   Patient will deny coccyx pain.    Long Term Goals: 8 weeks   Patient will be independent with progressive core awareness and stability.  Patient will deny leakage of urine.  Patient will be independent with core and pelvic floor contraction and relaxation.  Patient will demonstrate adequate pelvic floor coordination with contraction and relaxation on sEMG vs rehabilitative ultrasound to allow for improved bowel habits.      Physical therapy will include: therapeutic exercise, manual therapy, therapeutic activities, neuromuscular re-education, manual therapy, modalities PRN, gait training, and self-care education.       Therapist: Ruy Linares PT  Board-certified Women's Health Clinical Specialist  10/27/2023

## 2023-10-27 NOTE — PATIENT INSTRUCTIONS
DIAPHRAGMATIC BREATHING     The diaphragm is a dome shaped muscle that forms the floor of the rib cage. It is the most efficient muscle for breathing and relaxation, although most people are not used to using the diaphragm. Diaphragmatic or belly breathing is an important technique to learn because it helps settle down or relax the autonomic nervous system. The correct use of diaphragmatic breathing can help to quiet brain activity resulting in the relaxation of all the muscles and organs of the body. This is accomplished by slow rhythmic breathing concentrated in the diaphragm muscle rather than the chest.    How to do proper relaxation breathing:    Start by lying on your back or reclining in a chair in a relaxed position. Place one hand on your chest and the other on your abdomen.  Relax your jaw by placing your tongue on the floor of your mouth and keeping your teeth slightly apart.   Take a deep breath in, letting the abdomen expand and rise while you keep your upper chest, neck and shoulders relaxed.   As you breathe out, allow your abdomen and chest to fall. Exhale completely.  It doesn't matter if you breathe in/out through your nose and/or mouth. Do whichever feels comfortable.  Remember to breathe slowly.  Do not force your breathing. Do not hold your breath.  Repeat for 5 minutes every day.         Pelvic floor relaxation:   - occurs when urination, bowel movement, vaginal penetration, or passing gas  - every hour - tune into your pelvic floor and relax   - do not hold tension in the hips  - with breathing - let the diaphragm descend and the pelvic floor descend     Abdominal strength:   - inhale - open the lower ribs  - exhale - draw the lower ribs inward  - do not hold your breath - no one should know what you are doing   - 3x10, 10 sec hold

## 2023-11-07 ENCOUNTER — PATIENT MESSAGE (OUTPATIENT)
Dept: OTHER | Facility: OTHER | Age: 28
End: 2023-11-07
Payer: COMMERCIAL

## 2023-11-08 ENCOUNTER — OFFICE VISIT (OUTPATIENT)
Dept: INTERNAL MEDICINE | Facility: CLINIC | Age: 28
End: 2023-11-08
Payer: COMMERCIAL

## 2023-11-08 VITALS
DIASTOLIC BLOOD PRESSURE: 66 MMHG | RESPIRATION RATE: 16 BRPM | SYSTOLIC BLOOD PRESSURE: 104 MMHG | OXYGEN SATURATION: 99 % | WEIGHT: 198.44 LBS | HEART RATE: 87 BPM | HEIGHT: 62 IN | BODY MASS INDEX: 36.52 KG/M2

## 2023-11-08 DIAGNOSIS — F90.2 ADHD (ATTENTION DEFICIT HYPERACTIVITY DISORDER), COMBINED TYPE: ICD-10-CM

## 2023-11-08 DIAGNOSIS — F41.1 GAD (GENERALIZED ANXIETY DISORDER): Primary | ICD-10-CM

## 2023-11-08 PROCEDURE — 3044F PR MOST RECENT HEMOGLOBIN A1C LEVEL <7.0%: ICD-10-PCS | Mod: CPTII,S$GLB,, | Performed by: INTERNAL MEDICINE

## 2023-11-08 PROCEDURE — 3044F HG A1C LEVEL LT 7.0%: CPT | Mod: CPTII,S$GLB,, | Performed by: INTERNAL MEDICINE

## 2023-11-08 PROCEDURE — 99213 PR OFFICE/OUTPT VISIT, EST, LEVL III, 20-29 MIN: ICD-10-PCS | Mod: S$GLB,,, | Performed by: INTERNAL MEDICINE

## 2023-11-08 PROCEDURE — 1159F PR MEDICATION LIST DOCUMENTED IN MEDICAL RECORD: ICD-10-PCS | Mod: CPTII,S$GLB,, | Performed by: INTERNAL MEDICINE

## 2023-11-08 PROCEDURE — 3008F BODY MASS INDEX DOCD: CPT | Mod: CPTII,S$GLB,, | Performed by: INTERNAL MEDICINE

## 2023-11-08 PROCEDURE — 1160F RVW MEDS BY RX/DR IN RCRD: CPT | Mod: CPTII,S$GLB,, | Performed by: INTERNAL MEDICINE

## 2023-11-08 PROCEDURE — 3078F DIAST BP <80 MM HG: CPT | Mod: CPTII,S$GLB,, | Performed by: INTERNAL MEDICINE

## 2023-11-08 PROCEDURE — 99999 PR PBB SHADOW E&M-EST. PATIENT-LVL III: CPT | Mod: PBBFAC,,, | Performed by: INTERNAL MEDICINE

## 2023-11-08 PROCEDURE — 3074F SYST BP LT 130 MM HG: CPT | Mod: CPTII,S$GLB,, | Performed by: INTERNAL MEDICINE

## 2023-11-08 PROCEDURE — 3008F PR BODY MASS INDEX (BMI) DOCUMENTED: ICD-10-PCS | Mod: CPTII,S$GLB,, | Performed by: INTERNAL MEDICINE

## 2023-11-08 PROCEDURE — 99999 PR PBB SHADOW E&M-EST. PATIENT-LVL III: ICD-10-PCS | Mod: PBBFAC,,, | Performed by: INTERNAL MEDICINE

## 2023-11-08 PROCEDURE — 3078F PR MOST RECENT DIASTOLIC BLOOD PRESSURE < 80 MM HG: ICD-10-PCS | Mod: CPTII,S$GLB,, | Performed by: INTERNAL MEDICINE

## 2023-11-08 PROCEDURE — 3074F PR MOST RECENT SYSTOLIC BLOOD PRESSURE < 130 MM HG: ICD-10-PCS | Mod: CPTII,S$GLB,, | Performed by: INTERNAL MEDICINE

## 2023-11-08 PROCEDURE — 1160F PR REVIEW ALL MEDS BY PRESCRIBER/CLIN PHARMACIST DOCUMENTED: ICD-10-PCS | Mod: CPTII,S$GLB,, | Performed by: INTERNAL MEDICINE

## 2023-11-08 PROCEDURE — 99213 OFFICE O/P EST LOW 20 MIN: CPT | Mod: S$GLB,,, | Performed by: INTERNAL MEDICINE

## 2023-11-08 PROCEDURE — 1159F MED LIST DOCD IN RCRD: CPT | Mod: CPTII,S$GLB,, | Performed by: INTERNAL MEDICINE

## 2023-11-08 NOTE — PROGRESS NOTES
"Subjective:       Patient ID: Xuan Quintanilla is a 28 y.o. female.    Chief Complaint: Follow-up      HPI:  Patient is nown to me and presents for follow up ADHD and anxiety/OCD.     ADHD and anxiety: she was on Zoloft 25mg and Adderall 10mg when established last visit. Overall was working well. WAs taking adderall daily and sometimes could skip her dose.  Since last visit she got pregnant and stopped both medications.  She is 30 weeks right now.  She was started on buspar by OB for anxiety sx. She thinks it is working "OK". She doesn't take daily, really just using as needed.  For the most part she reports she is doing well. She is getting through the work day without panic attacks. Sleep is poor but pregnancy issues causing this. She has some emotional periods (cried over her first stretch yoil all day) but these are not daily and not interfering with her day to day life.     Tobacco use: denies  EtOH use: no daily use  Illicit drug use: denies        Past Medical History:   Diagnosis Date    ADHD (attention deficit hyperactivity disorder), combined type 03/2018    Jasper Goldberg 81    Adolescent idiopathic scoliosis of thoracolumbar region     Agoraphobia with panic attacks     Anxiety hyperventilation     Chronic seasonal allergic rhinitis     COVID-19 10/24/2022    Hx of infectious mononucleosis     Obsessive compulsive disorder     PCR DNA positive for HSV1        Family History   Problem Relation Age of Onset    Hypertension Mother     Miscarriages / Stillbirths Mother     Obesity Mother     Anxiety disorder Mother     Metabolic syndrome Mother     ADD / ADHD Mother     Polycystic ovary syndrome Mother     Endometriosis Mother     Blindness Father         right eye    Heart attack Maternal Uncle     Stroke Maternal Uncle     Heart disease Maternal Grandmother     Arthritis Maternal Grandmother     Cancer Maternal Grandfather     Heart attack Maternal Grandfather     Breast cancer Neg Hx     Colon cancer Neg " Hx     Diabetes Neg Hx        Social History     Socioeconomic History    Marital status:      Spouse name: Alf Keller    Number of children: 0    Highest education level: Some college, no degree   Occupational History    Occupation: Student at [employer], LPN      Comment: working as Tech in ER    Occupation: Former manager AE 5+ years   Tobacco Use    Smoking status: Never    Smokeless tobacco: Never   Substance and Sexual Activity    Alcohol use: Not Currently     Comment: once every 2 years    Drug use: Yes     Types: Amphetamines    Sexual activity: Yes     Partners: Male     Birth control/protection: None       Review of Systems   Constitutional:  Positive for fatigue. Negative for activity change, fever and unexpected weight change.   HENT:  Negative for congestion, ear pain, hearing loss, rhinorrhea and sore throat.    Eyes:  Negative for pain, redness and visual disturbance.   Respiratory:  Negative for cough, shortness of breath and wheezing.    Cardiovascular:  Negative for chest pain, palpitations and leg swelling.   Gastrointestinal:  Negative for abdominal pain, constipation, diarrhea, nausea and vomiting.   Genitourinary:  Negative for dysuria, frequency, pelvic pain and urgency.   Musculoskeletal:  Negative for back pain, joint swelling and neck pain.   Skin:  Negative for color change, rash and wound.   Neurological:  Negative for dizziness, tremors, weakness, light-headedness and headaches.         Objective:      Physical Exam  Vitals reviewed.   Constitutional:       General: She is not in acute distress.     Appearance: She is well-developed.   HENT:      Head: Normocephalic and atraumatic.      Right Ear: External ear normal.      Left Ear: External ear normal.      Nose: Nose normal.   Eyes:      General:         Right eye: No discharge.         Left eye: No discharge.      Conjunctiva/sclera: Conjunctivae normal.   Neck:      Thyroid: No thyromegaly.   Cardiovascular:      Rate and  Rhythm: Normal rate and regular rhythm.      Heart sounds: No murmur heard.  Pulmonary:      Effort: Pulmonary effort is normal. No respiratory distress.      Breath sounds: Normal breath sounds. No wheezing or rales.   Skin:     General: Skin is warm and dry.   Neurological:      Mental Status: She is alert and oriented to person, place, and time.   Psychiatric:         Behavior: Behavior normal.         Thought Content: Thought content normal.         Assessment:       1. TOREY (generalized anxiety disorder)    2. ADHD (attention deficit hyperactivity disorder), combined type        Plan:       1. TOREY (generalized anxiety disorder)  Chronic stable    2. ADHD (attention deficit hyperactivity disorder), combined type  Chronic stable    For now, continue buspar  At 30 weeks by the time we switch to SSRI and really can re-eval effectiveness she will be nearing delivery. She is stable and I think we can continue current treatment and re-eval post partum need to resume SSRI  If anxiety/depression sx post partum would resume zoloft first at 25mg daily and titrate up to 50mg after 4 weeks if not effective and no concerning side effects.   Discussed safety of SSRI with breastfeeding  Would hold adding stimulant if breastfeeding initially and see how she responds to SSRI alone. We discussed risks/benefits to baby of meds vs mom's mental health so can re-eval in future if need to resume    Otherwise stable medically.       RTC 1 year for routine and PRN pending anxiety/depression sx post partum. She can call me anytime!

## 2023-11-10 ENCOUNTER — ROUTINE PRENATAL (OUTPATIENT)
Dept: OBSTETRICS AND GYNECOLOGY | Facility: CLINIC | Age: 28
End: 2023-11-10
Payer: COMMERCIAL

## 2023-11-10 VITALS
SYSTOLIC BLOOD PRESSURE: 118 MMHG | BODY MASS INDEX: 36.21 KG/M2 | DIASTOLIC BLOOD PRESSURE: 74 MMHG | WEIGHT: 198 LBS | HEART RATE: 93 BPM

## 2023-11-10 DIAGNOSIS — Z36.89 ENCOUNTER FOR ULTRASOUND TO ASSESS FETAL GROWTH: ICD-10-CM

## 2023-11-10 DIAGNOSIS — Z34.03 ENCOUNTER FOR SUPERVISION OF NORMAL FIRST PREGNANCY IN THIRD TRIMESTER: Primary | ICD-10-CM

## 2023-11-10 DIAGNOSIS — Z3A.30 30 WEEKS GESTATION OF PREGNANCY: ICD-10-CM

## 2023-11-10 PROCEDURE — 99999 PR PBB SHADOW E&M-EST. PATIENT-LVL II: CPT | Mod: PBBFAC,,, | Performed by: OBSTETRICS & GYNECOLOGY

## 2023-11-10 PROCEDURE — 0502F SUBSEQUENT PRENATAL CARE: CPT | Mod: CPTII,S$GLB,, | Performed by: OBSTETRICS & GYNECOLOGY

## 2023-11-10 PROCEDURE — 0502F PR SUBSEQUENT PRENATAL CARE: ICD-10-PCS | Mod: CPTII,S$GLB,, | Performed by: OBSTETRICS & GYNECOLOGY

## 2023-11-10 PROCEDURE — 99999 PR PBB SHADOW E&M-EST. PATIENT-LVL II: ICD-10-PCS | Mod: PBBFAC,,, | Performed by: OBSTETRICS & GYNECOLOGY

## 2023-11-10 NOTE — PROGRESS NOTES
Patient with no complaints. Denies vaginal bleeding or contractions. Good FM. Growth scan ordered for next visit. RTC in 2 weeks.     Vitals signs, FHTs, urine dip, and PE findings documented, reviewed and available in OB flow chart.       I spent a total of 20 minutes on the day of the visit.This includes face to face time and non-face to face time preparing to see the patient (eg, review of tests), Obtaining and/or reviewing separately obtained history, Documenting clinical information in the electronic or other health record, Independently interpreting resultsand communicating results to the patient/family/caregiver, or Care coordination.     Coffective Motivation Document discussed with mother. Reinforced benefits of breastfeeding, risk of formula, and cue based feedings. Encouraged mother to download Coffective mobile lang if she has not already done so. Mother verbalizes understanding.

## 2023-11-27 ENCOUNTER — HOSPITAL ENCOUNTER (OUTPATIENT)
Dept: RADIOLOGY | Facility: HOSPITAL | Age: 28
Discharge: HOME | End: 2023-11-27
Attending: STUDENT IN AN ORGANIZED HEALTH CARE EDUCATION/TRAINING PROGRAM
Payer: COMMERCIAL

## 2023-11-27 ENCOUNTER — TELEPHONE (OUTPATIENT)
Dept: OBSTETRICS AND GYNECOLOGY | Facility: CLINIC | Age: 28
End: 2023-11-27
Payer: COMMERCIAL

## 2023-11-27 DIAGNOSIS — M79.662 PAIN OF LEFT CALF: ICD-10-CM

## 2023-11-27 DIAGNOSIS — M79.662 PAIN OF LEFT CALF: Primary | ICD-10-CM

## 2023-11-27 PROCEDURE — 93971 US LOWER EXTREMITY VEINS LEFT: ICD-10-PCS | Mod: 26,LT,, | Performed by: RADIOLOGY

## 2023-11-27 PROCEDURE — 93971 EXTREMITY STUDY: CPT | Mod: 26,LT,, | Performed by: RADIOLOGY

## 2023-11-27 PROCEDURE — 93971 EXTREMITY STUDY: CPT | Mod: TC,LT

## 2023-11-27 NOTE — TELEPHONE ENCOUNTER
Dr Carpio notified. Orders placed. Spoke to Melchor in radiology, patient added on for STAT u/s. Patient notified to go for u/s now.

## 2023-11-27 NOTE — TELEPHONE ENCOUNTER
----- Message from Cameron Iraheta LPN sent at 11/27/2023 10:32 AM CST -----  Patient sent message as below:    Hey! question, Saturday i randomly started with this pain/soreness behind my left leg. If feels like i've had the worst charley horse and it is super sore. is that normal??? once i get up and start moving, it goes away, just like a normal sore muscle, but i just wanted to make sure it wasn't something i needed to be concerned about.     Carlos Quintanilla  9:36 AM  Hey! question, Saturday i randomly started with this pain/soreness behind my left leg. If feels like i've had the worst charley horse and it is super sore. is that normal??? once i get up and start moving, it goes away, just like a normal sore muscle, but i just wanted to make sure it wasn't something i needed to be concerned about    9:39 AM  Cameron Iraheta  it's not swollen or anything huh?    Carlos Quintanilla  9:40 AM  no maam, no swelling and i made sure no veins were more prominent or anything     9:40 AM  Cameron Iraheta  Do you think it was a charley horse that left it sore like that?    Carlos Quintanilla  9:43 AM  i haven't had one that i can remember unless it was in my sleep and i just don't remember. But it started completellyyyyyy randomly in the middle of the day on Saturday     9:46 AM  Cameron Iraheta  No redness or warmth on the area where the pain is?    Carlos Quintanilla  9:46 AM  no maam, just feels like a sore muscle. but the random onset is what got me a little worried    9:50 AM  Cameron Iraheta  did you check for chris's sign?    Carlos Quintanilla  9:50 AM  what that is??? lol    Carlos Quintanilla  9:53 AM  i did not lol, had to google it     9:57 AM  Cameron Melissa, I was about to tell you to google it. Do you think Dr Vaughan would just look at it for me, not like an official visit with him or anything just eleuterio Dr Ventura is off? I am thinking you might need an u/s just to be safe but I would feel better if he looked to get his opinion.    Carlos Quintanilla  9:58 AM  yeah, he definitely will. i  will tell him what's going on and get him to check before i go to lunch and let you know    9:58 AM  Cameron Harrington, let me know what he says.    Carlos Quintanilla  9:58 AM  yes adia Quintanilla  10:19 AM  he said to please order a STAT US to just get it checked

## 2023-12-01 ENCOUNTER — ROUTINE PRENATAL (OUTPATIENT)
Dept: OBSTETRICS AND GYNECOLOGY | Facility: CLINIC | Age: 28
End: 2023-12-01
Payer: COMMERCIAL

## 2023-12-01 ENCOUNTER — PROCEDURE VISIT (OUTPATIENT)
Dept: OBSTETRICS AND GYNECOLOGY | Facility: CLINIC | Age: 28
End: 2023-12-01
Payer: COMMERCIAL

## 2023-12-01 VITALS
DIASTOLIC BLOOD PRESSURE: 76 MMHG | WEIGHT: 198.63 LBS | BODY MASS INDEX: 36.33 KG/M2 | HEART RATE: 106 BPM | SYSTOLIC BLOOD PRESSURE: 118 MMHG

## 2023-12-01 DIAGNOSIS — Z36.89 ENCOUNTER FOR ULTRASOUND TO ASSESS FETAL GROWTH: ICD-10-CM

## 2023-12-01 DIAGNOSIS — Z3A.33 33 WEEKS GESTATION OF PREGNANCY: ICD-10-CM

## 2023-12-01 DIAGNOSIS — Z34.03 ENCOUNTER FOR SUPERVISION OF NORMAL FIRST PREGNANCY IN THIRD TRIMESTER: Primary | ICD-10-CM

## 2023-12-01 PROCEDURE — 0502F PR SUBSEQUENT PRENATAL CARE: ICD-10-PCS | Mod: CPTII,S$GLB,, | Performed by: OBSTETRICS & GYNECOLOGY

## 2023-12-01 PROCEDURE — 76816 US OB/GYN EXTENDED PROCEDURE (VIEWPOINT): ICD-10-PCS | Mod: S$GLB,,, | Performed by: OBSTETRICS & GYNECOLOGY

## 2023-12-01 PROCEDURE — 99999 PR PBB SHADOW E&M-EST. PATIENT-LVL II: CPT | Mod: PBBFAC,,, | Performed by: OBSTETRICS & GYNECOLOGY

## 2023-12-01 PROCEDURE — 0502F SUBSEQUENT PRENATAL CARE: CPT | Mod: CPTII,S$GLB,, | Performed by: OBSTETRICS & GYNECOLOGY

## 2023-12-01 PROCEDURE — 99999 PR PBB SHADOW E&M-EST. PATIENT-LVL II: ICD-10-PCS | Mod: PBBFAC,,, | Performed by: OBSTETRICS & GYNECOLOGY

## 2023-12-01 PROCEDURE — 76816 OB US FOLLOW-UP PER FETUS: CPT | Mod: S$GLB,,, | Performed by: OBSTETRICS & GYNECOLOGY

## 2023-12-01 NOTE — PROGRESS NOTES
Patient with no complaints. Denies vaginal bleeding or contractions. Good FM. Growth scan reviewed. RTC in 2 weeks.     Vertex, normal MVP, 53% (5 pounds 13 ounces)    Vitals signs, FHTs, urine dip, and PE findings documented, reviewed and available in OB flow chart.       I spent a total of 20 minutes on the day of the visit.This includes face to face time and non-face to face time preparing to see the patient (eg, review of tests), Obtaining and/or reviewing separately obtained history, Documenting clinical information in the electronic or other health record, Independently interpreting resultsand communicating results to the patient/family/caregiver, or Care coordination.     Coffective counseling sheet Build Your Team discussed with mother. Reinforced importance of early identification of support team including champion, OB provider, pediatrician and local community resources. Encouraged mother to download Coffective mobile lang if she has not already done so.  Mother verbalizes understanding. Also discussed quiet time and delayed bathing.

## 2023-12-12 ENCOUNTER — PATIENT MESSAGE (OUTPATIENT)
Dept: OTHER | Facility: OTHER | Age: 28
End: 2023-12-12
Payer: COMMERCIAL

## 2023-12-13 ENCOUNTER — ROUTINE PRENATAL (OUTPATIENT)
Dept: OBSTETRICS AND GYNECOLOGY | Facility: CLINIC | Age: 28
End: 2023-12-13
Payer: COMMERCIAL

## 2023-12-13 VITALS
HEART RATE: 104 BPM | SYSTOLIC BLOOD PRESSURE: 120 MMHG | BODY MASS INDEX: 36.85 KG/M2 | WEIGHT: 201.5 LBS | DIASTOLIC BLOOD PRESSURE: 76 MMHG

## 2023-12-13 DIAGNOSIS — Z34.03 ENCOUNTER FOR SUPERVISION OF NORMAL FIRST PREGNANCY IN THIRD TRIMESTER: Primary | ICD-10-CM

## 2023-12-13 DIAGNOSIS — Z36.85 ANTENATAL SCREENING FOR STREPTOCOCCUS B: ICD-10-CM

## 2023-12-13 DIAGNOSIS — Z3A.35 35 WEEKS GESTATION OF PREGNANCY: ICD-10-CM

## 2023-12-13 PROCEDURE — 99999 PR PBB SHADOW E&M-EST. PATIENT-LVL II: CPT | Mod: PBBFAC,,, | Performed by: OBSTETRICS & GYNECOLOGY

## 2023-12-13 PROCEDURE — 0502F SUBSEQUENT PRENATAL CARE: CPT | Mod: CPTII,S$GLB,, | Performed by: OBSTETRICS & GYNECOLOGY

## 2023-12-13 PROCEDURE — 0502F PR SUBSEQUENT PRENATAL CARE: ICD-10-PCS | Mod: CPTII,S$GLB,, | Performed by: OBSTETRICS & GYNECOLOGY

## 2023-12-13 PROCEDURE — 87081 CULTURE SCREEN ONLY: CPT | Performed by: OBSTETRICS & GYNECOLOGY

## 2023-12-13 PROCEDURE — 99999 PR PBB SHADOW E&M-EST. PATIENT-LVL II: ICD-10-PCS | Mod: PBBFAC,,, | Performed by: OBSTETRICS & GYNECOLOGY

## 2023-12-13 NOTE — PROGRESS NOTES
Patient with no complaints. Denies vaginal bleeding or contractions. Good FM. GBS collected today. Labor precautions discused with patient. RTC in 1 week.     Vitals signs, FHTs, urine dip, and PE findings documented, reviewed and available in OB flow chart.       I spent a total of 20 minutes on the day of the visit.This includes face to face time and non-face to face time preparing to see the patient (eg, review of tests), Obtaining and/or reviewing separately obtained history, Documenting clinical information in the electronic or other health record, Independently interpreting resultsand communicating results to the patient/family/caregiver, or Care coordination.

## 2023-12-16 LAB — BACTERIA SPEC AEROBE CULT: NORMAL

## 2023-12-22 ENCOUNTER — ROUTINE PRENATAL (OUTPATIENT)
Dept: OBSTETRICS AND GYNECOLOGY | Facility: CLINIC | Age: 28
End: 2023-12-22
Payer: COMMERCIAL

## 2023-12-22 VITALS
HEART RATE: 105 BPM | SYSTOLIC BLOOD PRESSURE: 128 MMHG | BODY MASS INDEX: 37.18 KG/M2 | WEIGHT: 203.25 LBS | DIASTOLIC BLOOD PRESSURE: 72 MMHG

## 2023-12-22 DIAGNOSIS — Z34.90 PREGNANCY, UNSPECIFIED GESTATIONAL AGE: ICD-10-CM

## 2023-12-22 DIAGNOSIS — Z34.03 ENCOUNTER FOR SUPERVISION OF NORMAL FIRST PREGNANCY IN THIRD TRIMESTER: Primary | ICD-10-CM

## 2023-12-22 DIAGNOSIS — Z3A.36 36 WEEKS GESTATION OF PREGNANCY: ICD-10-CM

## 2023-12-22 LAB
BILIRUB SERPL-MCNC: NEGATIVE MG/DL
BLOOD URINE, POC: NEGATIVE
CLARITY, POC UA: CLEAR
COLOR, POC UA: YELLOW
GLUCOSE UR QL STRIP: NEGATIVE
KETONES UR QL STRIP: NORMAL
LEUKOCYTE ESTERASE URINE, POC: NORMAL
NITRITE, POC UA: NEGATIVE
PH, POC UA: 6.5
PROTEIN, POC: NEGATIVE
SPECIFIC GRAVITY, POC UA: 1.02
UROBILINOGEN, POC UA: NORMAL

## 2023-12-22 PROCEDURE — 0502F PR SUBSEQUENT PRENATAL CARE: ICD-10-PCS | Mod: CPTII,S$GLB,, | Performed by: OBSTETRICS & GYNECOLOGY

## 2023-12-22 PROCEDURE — 0502F SUBSEQUENT PRENATAL CARE: CPT | Mod: CPTII,S$GLB,, | Performed by: OBSTETRICS & GYNECOLOGY

## 2023-12-22 PROCEDURE — 99999 PR PBB SHADOW E&M-EST. PATIENT-LVL II: CPT | Mod: PBBFAC,,, | Performed by: OBSTETRICS & GYNECOLOGY

## 2023-12-22 PROCEDURE — 99999 PR PBB SHADOW E&M-EST. PATIENT-LVL II: ICD-10-PCS | Mod: PBBFAC,,, | Performed by: OBSTETRICS & GYNECOLOGY

## 2023-12-26 DIAGNOSIS — Z36.89 ENCOUNTER FOR ULTRASOUND TO ASSESS FETAL GROWTH: Primary | ICD-10-CM

## 2023-12-27 ENCOUNTER — TELEPHONE (OUTPATIENT)
Dept: OBSTETRICS AND GYNECOLOGY | Facility: CLINIC | Age: 28
End: 2023-12-27
Payer: COMMERCIAL

## 2023-12-27 NOTE — TELEPHONE ENCOUNTER
Maternity leave paperwork from [facility] received, completed and placed for Dr Lorenzo coelho.

## 2023-12-29 ENCOUNTER — ROUTINE PRENATAL (OUTPATIENT)
Dept: OBSTETRICS AND GYNECOLOGY | Facility: CLINIC | Age: 28
End: 2023-12-29
Payer: COMMERCIAL

## 2023-12-29 ENCOUNTER — PROCEDURE VISIT (OUTPATIENT)
Dept: OBSTETRICS AND GYNECOLOGY | Facility: CLINIC | Age: 28
End: 2023-12-29
Payer: COMMERCIAL

## 2023-12-29 VITALS
SYSTOLIC BLOOD PRESSURE: 124 MMHG | WEIGHT: 204.06 LBS | BODY MASS INDEX: 37.32 KG/M2 | HEART RATE: 101 BPM | DIASTOLIC BLOOD PRESSURE: 78 MMHG

## 2023-12-29 DIAGNOSIS — Z34.03 ENCOUNTER FOR SUPERVISION OF NORMAL FIRST PREGNANCY IN THIRD TRIMESTER: Primary | ICD-10-CM

## 2023-12-29 DIAGNOSIS — Z3A.37 37 WEEKS GESTATION OF PREGNANCY: ICD-10-CM

## 2023-12-29 DIAGNOSIS — Z36.89 ENCOUNTER FOR ULTRASOUND TO ASSESS FETAL GROWTH: ICD-10-CM

## 2023-12-29 PROCEDURE — 0502F PR SUBSEQUENT PRENATAL CARE: ICD-10-PCS | Mod: CPTII,S$GLB,, | Performed by: OBSTETRICS & GYNECOLOGY

## 2023-12-29 PROCEDURE — 0502F SUBSEQUENT PRENATAL CARE: CPT | Mod: CPTII,S$GLB,, | Performed by: OBSTETRICS & GYNECOLOGY

## 2023-12-29 PROCEDURE — 76816 OB US FOLLOW-UP PER FETUS: CPT | Mod: S$GLB,,, | Performed by: OBSTETRICS & GYNECOLOGY

## 2023-12-29 PROCEDURE — 99999 PR PBB SHADOW E&M-EST. PATIENT-LVL II: CPT | Mod: PBBFAC,,, | Performed by: OBSTETRICS & GYNECOLOGY

## 2023-12-29 PROCEDURE — 99999 PR PBB SHADOW E&M-EST. PATIENT-LVL II: ICD-10-PCS | Mod: PBBFAC,,, | Performed by: OBSTETRICS & GYNECOLOGY

## 2024-01-03 ENCOUNTER — ROUTINE PRENATAL (OUTPATIENT)
Dept: OBSTETRICS AND GYNECOLOGY | Facility: CLINIC | Age: 29
End: 2024-01-03
Payer: COMMERCIAL

## 2024-01-03 VITALS
SYSTOLIC BLOOD PRESSURE: 118 MMHG | HEART RATE: 84 BPM | BODY MASS INDEX: 37.13 KG/M2 | DIASTOLIC BLOOD PRESSURE: 74 MMHG | WEIGHT: 203 LBS

## 2024-01-03 DIAGNOSIS — Z34.03 ENCOUNTER FOR SUPERVISION OF NORMAL FIRST PREGNANCY IN THIRD TRIMESTER: Primary | ICD-10-CM

## 2024-01-03 DIAGNOSIS — Z3A.38 38 WEEKS GESTATION OF PREGNANCY: ICD-10-CM

## 2024-01-03 PROCEDURE — 99999 PR PBB SHADOW E&M-EST. PATIENT-LVL II: CPT | Mod: PBBFAC,,, | Performed by: OBSTETRICS & GYNECOLOGY

## 2024-01-03 PROCEDURE — 0502F SUBSEQUENT PRENATAL CARE: CPT | Mod: CPTII,S$GLB,, | Performed by: OBSTETRICS & GYNECOLOGY

## 2024-01-11 ENCOUNTER — ROUTINE PRENATAL (OUTPATIENT)
Dept: OBSTETRICS AND GYNECOLOGY | Facility: CLINIC | Age: 29
End: 2024-01-11
Payer: COMMERCIAL

## 2024-01-11 VITALS
SYSTOLIC BLOOD PRESSURE: 122 MMHG | DIASTOLIC BLOOD PRESSURE: 74 MMHG | WEIGHT: 205.38 LBS | HEART RATE: 99 BPM | BODY MASS INDEX: 37.56 KG/M2

## 2024-01-11 DIAGNOSIS — Z3A.39 39 WEEKS GESTATION OF PREGNANCY: ICD-10-CM

## 2024-01-11 DIAGNOSIS — Z34.03 ENCOUNTER FOR SUPERVISION OF NORMAL FIRST PREGNANCY IN THIRD TRIMESTER: Primary | ICD-10-CM

## 2024-01-11 PROCEDURE — 0502F SUBSEQUENT PRENATAL CARE: CPT | Mod: CPTII,S$GLB,, | Performed by: OBSTETRICS & GYNECOLOGY

## 2024-01-11 PROCEDURE — 99999 PR PBB SHADOW E&M-EST. PATIENT-LVL II: CPT | Mod: PBBFAC,,, | Performed by: OBSTETRICS & GYNECOLOGY

## 2024-01-11 NOTE — PROGRESS NOTES
Patient with no complaints. Denies vaginal bleeding or contractions. Good FM. RTC in 1 week.    IOL scheduled for next week; tentatively 1/17 possibly 1/19     Vitals signs, FHTs, urine dip, and PE findings documented, reviewed and available in OB flow chart.       I spent a total of 20 minutes on the day of the visit.This includes face to face time and non-face to face time preparing to see the patient (eg, review of tests), Obtaining and/or reviewing separately obtained history, Documenting clinical information in the electronic or other health record, Independently interpreting resultsand communicating results to the patient/family/caregiver, or Care coordination.

## 2024-01-13 ENCOUNTER — ANESTHESIA (OUTPATIENT)
Dept: SURGERY | Facility: HOSPITAL | Age: 29
End: 2024-01-13
Payer: COMMERCIAL

## 2024-01-13 ENCOUNTER — HOSPITAL ENCOUNTER (INPATIENT)
Facility: HOSPITAL | Age: 29
LOS: 2 days | Discharge: HOME | End: 2024-01-15
Attending: OBSTETRICS & GYNECOLOGY | Admitting: OBSTETRICS & GYNECOLOGY
Payer: COMMERCIAL

## 2024-01-13 ENCOUNTER — ANESTHESIA EVENT (OUTPATIENT)
Dept: SURGERY | Facility: HOSPITAL | Age: 29
End: 2024-01-13
Payer: COMMERCIAL

## 2024-01-13 DIAGNOSIS — Z37.9 NORMAL LABOR: Primary | ICD-10-CM

## 2024-01-13 DIAGNOSIS — Z98.891 STATUS POST PRIMARY LOW TRANSVERSE CESAREAN SECTION: ICD-10-CM

## 2024-01-13 LAB
ABO + RH BLD: NORMAL
BASOPHILS # BLD AUTO: 0.05 K/UL (ref 0–0.2)
BASOPHILS NFR BLD: 0.3 % (ref 0–1.9)
BLD GP AB SCN CELLS X3 SERPL QL: NORMAL
DIFFERENTIAL METHOD BLD: ABNORMAL
EOSINOPHIL # BLD AUTO: 0 K/UL (ref 0–0.5)
EOSINOPHIL NFR BLD: 0.1 % (ref 0–8)
ERYTHROCYTE [DISTWIDTH] IN BLOOD BY AUTOMATED COUNT: 13.1 % (ref 11.5–14.5)
HCT VFR BLD AUTO: 38.2 % (ref 37–48.5)
HGB BLD-MCNC: 13.4 G/DL (ref 12–16)
IMM GRANULOCYTES # BLD AUTO: 0.11 K/UL (ref 0–0.04)
IMM GRANULOCYTES NFR BLD AUTO: 0.6 % (ref 0–0.5)
LYMPHOCYTES # BLD AUTO: 1.4 K/UL (ref 1–4.8)
LYMPHOCYTES NFR BLD: 7 % (ref 18–48)
MCH RBC QN AUTO: 30.1 PG (ref 27–31)
MCHC RBC AUTO-ENTMCNC: 35.1 G/DL (ref 32–36)
MCV RBC AUTO: 86 FL (ref 82–98)
MONOCYTES # BLD AUTO: 1.1 K/UL (ref 0.3–1)
MONOCYTES NFR BLD: 5.5 % (ref 4–15)
NEUTROPHILS # BLD AUTO: 16.6 K/UL (ref 1.8–7.7)
NEUTROPHILS NFR BLD: 86.5 % (ref 38–73)
NRBC BLD-RTO: 0 /100 WBC
PLATELET # BLD AUTO: 174 K/UL (ref 150–450)
PMV BLD AUTO: 10.7 FL (ref 9.2–12.9)
RBC # BLD AUTO: 4.45 M/UL (ref 4–5.4)
WBC # BLD AUTO: 19.17 K/UL (ref 3.9–12.7)

## 2024-01-13 PROCEDURE — 25000003 PHARM REV CODE 250: Performed by: OBSTETRICS & GYNECOLOGY

## 2024-01-13 PROCEDURE — 85025 COMPLETE CBC W/AUTO DIFF WBC: CPT | Performed by: OBSTETRICS & GYNECOLOGY

## 2024-01-13 PROCEDURE — 72100003 HC LABOR CARE, EA. ADDL. 8 HRS

## 2024-01-13 PROCEDURE — 01967 NEURAXL LBR ANES VAG DLVR: CPT | Mod: QZ,P2 | Performed by: NURSE ANESTHETIST, CERTIFIED REGISTERED

## 2024-01-13 PROCEDURE — 63600175 PHARM REV CODE 636 W HCPCS: Performed by: NURSE ANESTHETIST, CERTIFIED REGISTERED

## 2024-01-13 PROCEDURE — 82565 ASSAY OF CREATININE: CPT | Performed by: OBSTETRICS & GYNECOLOGY

## 2024-01-13 PROCEDURE — 62326 NJX INTERLAMINAR LMBR/SAC: CPT | Performed by: NURSE ANESTHETIST, CERTIFIED REGISTERED

## 2024-01-13 PROCEDURE — 86850 RBC ANTIBODY SCREEN: CPT | Performed by: OBSTETRICS & GYNECOLOGY

## 2024-01-13 PROCEDURE — 59510AH PRA FULL ROUT OBSTE CARE,CESAREAN DELIV: Mod: QZ,P2 | Performed by: NURSE ANESTHETIST, CERTIFIED REGISTERED

## 2024-01-13 PROCEDURE — 72100002 HC LABOR CARE, 1ST 8 HOURS

## 2024-01-13 PROCEDURE — 01968 ANES/ANALG CS DLVR NEURAXIAL: CPT | Performed by: NURSE ANESTHETIST, CERTIFIED REGISTERED

## 2024-01-13 PROCEDURE — 11000001 HC ACUTE MED/SURG PRIVATE ROOM

## 2024-01-13 PROCEDURE — 63600175 PHARM REV CODE 636 W HCPCS: Performed by: OBSTETRICS & GYNECOLOGY

## 2024-01-13 PROCEDURE — 36415 COLL VENOUS BLD VENIPUNCTURE: CPT | Performed by: OBSTETRICS & GYNECOLOGY

## 2024-01-13 PROCEDURE — 86592 SYPHILIS TEST NON-TREP QUAL: CPT | Performed by: OBSTETRICS & GYNECOLOGY

## 2024-01-13 RX ORDER — MISOPROSTOL 200 UG/1
800 TABLET ORAL ONCE AS NEEDED
Status: DISCONTINUED | OUTPATIENT
Start: 2024-01-13 | End: 2024-01-14

## 2024-01-13 RX ORDER — BUTORPHANOL TARTRATE 2 MG/ML
1 INJECTION INTRAMUSCULAR; INTRAVENOUS
Status: DISCONTINUED | OUTPATIENT
Start: 2024-01-13 | End: 2024-01-14

## 2024-01-13 RX ORDER — OXYTOCIN/RINGER'S LACTATE 30/500 ML
334 PLASTIC BAG, INJECTION (ML) INTRAVENOUS ONCE
Status: DISCONTINUED | OUTPATIENT
Start: 2024-01-13 | End: 2024-01-14

## 2024-01-13 RX ORDER — SIMETHICONE 80 MG
1 TABLET,CHEWABLE ORAL 4 TIMES DAILY PRN
Status: DISCONTINUED | OUTPATIENT
Start: 2024-01-13 | End: 2024-01-14

## 2024-01-13 RX ORDER — CALCIUM CARBONATE 200(500)MG
500 TABLET,CHEWABLE ORAL 3 TIMES DAILY PRN
Status: DISCONTINUED | OUTPATIENT
Start: 2024-01-13 | End: 2024-01-15 | Stop reason: HOSPADM

## 2024-01-13 RX ORDER — ONDANSETRON 8 MG/1
8 TABLET, ORALLY DISINTEGRATING ORAL EVERY 8 HOURS PRN
Status: DISCONTINUED | OUTPATIENT
Start: 2024-01-13 | End: 2024-01-14

## 2024-01-13 RX ORDER — DIPHENOXYLATE HYDROCHLORIDE AND ATROPINE SULFATE 2.5; .025 MG/1; MG/1
2 TABLET ORAL EVERY 6 HOURS PRN
Status: DISCONTINUED | OUTPATIENT
Start: 2024-01-13 | End: 2024-01-15 | Stop reason: HOSPADM

## 2024-01-13 RX ORDER — LIDOCAINE HYDROCHLORIDE 10 MG/ML
10 INJECTION INFILTRATION; PERINEURAL ONCE AS NEEDED
Status: DISCONTINUED | OUTPATIENT
Start: 2024-01-13 | End: 2024-01-14

## 2024-01-13 RX ORDER — SODIUM CHLORIDE, SODIUM LACTATE, POTASSIUM CHLORIDE, CALCIUM CHLORIDE 600; 310; 30; 20 MG/100ML; MG/100ML; MG/100ML; MG/100ML
INJECTION, SOLUTION INTRAVENOUS CONTINUOUS
Status: DISCONTINUED | OUTPATIENT
Start: 2024-01-13 | End: 2024-01-14

## 2024-01-13 RX ORDER — OXYTOCIN/RINGER'S LACTATE 30/500 ML
0-30 PLASTIC BAG, INJECTION (ML) INTRAVENOUS CONTINUOUS
Status: DISCONTINUED | OUTPATIENT
Start: 2024-01-13 | End: 2024-01-14

## 2024-01-13 RX ORDER — OXYTOCIN/RINGER'S LACTATE 30/500 ML
95 PLASTIC BAG, INJECTION (ML) INTRAVENOUS CONTINUOUS
Status: ACTIVE | OUTPATIENT
Start: 2024-01-13 | End: 2024-01-13

## 2024-01-13 RX ORDER — FENTANYL CITRATE 50 UG/ML
INJECTION, SOLUTION INTRAMUSCULAR; INTRAVENOUS
Status: DISCONTINUED | OUTPATIENT
Start: 2024-01-13 | End: 2024-01-14

## 2024-01-13 RX ORDER — ROPIVACAINE HYDROCHLORIDE 2 MG/ML
INJECTION, SOLUTION EPIDURAL; INFILTRATION; PERINEURAL
Status: COMPLETED
Start: 2024-01-13 | End: 2024-01-13

## 2024-01-13 RX ORDER — TRANEXAMIC ACID 10 MG/ML
1000 INJECTION, SOLUTION INTRAVENOUS EVERY 30 MIN PRN
Status: DISCONTINUED | OUTPATIENT
Start: 2024-01-13 | End: 2024-01-14

## 2024-01-13 RX ORDER — PROCHLORPERAZINE EDISYLATE 5 MG/ML
5 INJECTION INTRAMUSCULAR; INTRAVENOUS EVERY 6 HOURS PRN
Status: DISCONTINUED | OUTPATIENT
Start: 2024-01-13 | End: 2024-01-14

## 2024-01-13 RX ORDER — CARBOPROST TROMETHAMINE 250 UG/ML
250 INJECTION, SOLUTION INTRAMUSCULAR
Status: DISCONTINUED | OUTPATIENT
Start: 2024-01-13 | End: 2024-01-14

## 2024-01-13 RX ORDER — FENTANYL CITRATE 50 UG/ML
INJECTION, SOLUTION INTRAMUSCULAR; INTRAVENOUS
Status: COMPLETED
Start: 2024-01-13 | End: 2024-01-13

## 2024-01-13 RX ORDER — OXYTOCIN 10 [USP'U]/ML
10 INJECTION, SOLUTION INTRAMUSCULAR; INTRAVENOUS ONCE AS NEEDED
Status: COMPLETED | OUTPATIENT
Start: 2024-01-13 | End: 2024-01-14

## 2024-01-13 RX ORDER — METHYLERGONOVINE MALEATE 0.2 MG/ML
200 INJECTION INTRAVENOUS ONCE AS NEEDED
Status: DISCONTINUED | OUTPATIENT
Start: 2024-01-13 | End: 2024-01-14

## 2024-01-13 RX ORDER — ROPIVACAINE HYDROCHLORIDE 2 MG/ML
INJECTION, SOLUTION EPIDURAL; INFILTRATION CONTINUOUS PRN
Status: DISCONTINUED | OUTPATIENT
Start: 2024-01-13 | End: 2024-01-14

## 2024-01-13 RX ORDER — OXYTOCIN/RINGER'S LACTATE 30/500 ML
95 PLASTIC BAG, INJECTION (ML) INTRAVENOUS ONCE AS NEEDED
Status: DISCONTINUED | OUTPATIENT
Start: 2024-01-13 | End: 2024-01-15 | Stop reason: HOSPADM

## 2024-01-13 RX ORDER — OXYTOCIN/RINGER'S LACTATE 30/500 ML
334 PLASTIC BAG, INJECTION (ML) INTRAVENOUS ONCE AS NEEDED
Status: DISCONTINUED | OUTPATIENT
Start: 2024-01-13 | End: 2024-01-15 | Stop reason: HOSPADM

## 2024-01-13 RX ADMIN — SODIUM CHLORIDE, POTASSIUM CHLORIDE, SODIUM LACTATE AND CALCIUM CHLORIDE: 600; 310; 30; 20 INJECTION, SOLUTION INTRAVENOUS at 04:01

## 2024-01-13 RX ADMIN — SODIUM CHLORIDE, SODIUM LACTATE, POTASSIUM CHLORIDE, CALCIUM CHLORIDE AND DEXTROSE MONOHYDRATE 500 ML: 5; 600; 310; 30; 20 INJECTION, SOLUTION INTRAVENOUS at 10:01

## 2024-01-13 RX ADMIN — SODIUM CHLORIDE, POTASSIUM CHLORIDE, SODIUM LACTATE AND CALCIUM CHLORIDE: 600; 310; 30; 20 INJECTION, SOLUTION INTRAVENOUS at 06:01

## 2024-01-13 RX ADMIN — ROPIVACAINE HYDROCHLORIDE 12 ML/HR: 2 INJECTION, SOLUTION EPIDURAL; INFILTRATION at 06:01

## 2024-01-13 RX ADMIN — CALCIUM CARBONATE (ANTACID) CHEW TAB 500 MG 500 MG: 500 CHEW TAB at 10:01

## 2024-01-13 RX ADMIN — FENTANYL CITRATE 100 MCG: 50 INJECTION, SOLUTION INTRAMUSCULAR; INTRAVENOUS at 06:01

## 2024-01-14 PROBLEM — O41.1230 CHORIOAMNIONITIS IN THIRD TRIMESTER: Status: ACTIVE | Noted: 2024-01-14

## 2024-01-14 PROBLEM — Z37.9 NORMAL LABOR: Status: ACTIVE | Noted: 2024-01-14

## 2024-01-14 LAB
CREAT SERPL-MCNC: 0.8 MG/DL (ref 0.5–1.4)
EST. GFR  (NO RACE VARIABLE): >60 ML/MIN/1.73 M^2

## 2024-01-14 PROCEDURE — 63600175 PHARM REV CODE 636 W HCPCS

## 2024-01-14 PROCEDURE — 36000709 HC OR TIME LEV III EA ADD 15 MIN: Performed by: OBSTETRICS & GYNECOLOGY

## 2024-01-14 PROCEDURE — 25000003 PHARM REV CODE 250

## 2024-01-14 PROCEDURE — 25000003 PHARM REV CODE 250: Performed by: NURSE ANESTHETIST, CERTIFIED REGISTERED

## 2024-01-14 PROCEDURE — 59510 CESAREAN DELIVERY: CPT | Mod: GB,,, | Performed by: OBSTETRICS & GYNECOLOGY

## 2024-01-14 PROCEDURE — 25000003 PHARM REV CODE 250: Performed by: OBSTETRICS & GYNECOLOGY

## 2024-01-14 PROCEDURE — 36415 COLL VENOUS BLD VENIPUNCTURE: CPT | Performed by: OBSTETRICS & GYNECOLOGY

## 2024-01-14 PROCEDURE — 63600175 PHARM REV CODE 636 W HCPCS: Performed by: OBSTETRICS & GYNECOLOGY

## 2024-01-14 PROCEDURE — 37000009 HC ANESTHESIA EA ADD 15 MINS: Performed by: OBSTETRICS & GYNECOLOGY

## 2024-01-14 PROCEDURE — 99900035 HC TECH TIME PER 15 MIN (STAT)

## 2024-01-14 PROCEDURE — 36000708 HC OR TIME LEV III 1ST 15 MIN: Performed by: OBSTETRICS & GYNECOLOGY

## 2024-01-14 PROCEDURE — 63600175 PHARM REV CODE 636 W HCPCS: Performed by: NURSE ANESTHETIST, CERTIFIED REGISTERED

## 2024-01-14 PROCEDURE — 27000492 HC SLEEVE, SCD T/L

## 2024-01-14 PROCEDURE — 11000001 HC ACUTE MED/SURG PRIVATE ROOM

## 2024-01-14 PROCEDURE — 37000008 HC ANESTHESIA 1ST 15 MINUTES: Performed by: OBSTETRICS & GYNECOLOGY

## 2024-01-14 RX ORDER — ONDANSETRON HYDROCHLORIDE 2 MG/ML
INJECTION, SOLUTION INTRAMUSCULAR; INTRAVENOUS
Status: DISCONTINUED | OUTPATIENT
Start: 2024-01-14 | End: 2024-01-14

## 2024-01-14 RX ORDER — ROPIVACAINE HYDROCHLORIDE 5 MG/ML
INJECTION, SOLUTION EPIDURAL; INFILTRATION; PERINEURAL
Status: DISCONTINUED | OUTPATIENT
Start: 2024-01-14 | End: 2024-01-14

## 2024-01-14 RX ORDER — SODIUM CHLORIDE, SODIUM LACTATE, POTASSIUM CHLORIDE, CALCIUM CHLORIDE 600; 310; 30; 20 MG/100ML; MG/100ML; MG/100ML; MG/100ML
INJECTION, SOLUTION INTRAVENOUS CONTINUOUS PRN
Status: DISCONTINUED | OUTPATIENT
Start: 2024-01-14 | End: 2024-01-14

## 2024-01-14 RX ORDER — ONDANSETRON 8 MG/1
8 TABLET, ORALLY DISINTEGRATING ORAL EVERY 8 HOURS PRN
Status: DISCONTINUED | OUTPATIENT
Start: 2024-01-14 | End: 2024-01-14

## 2024-01-14 RX ORDER — DEXAMETHASONE SODIUM PHOSPHATE 4 MG/ML
INJECTION, SOLUTION INTRA-ARTICULAR; INTRALESIONAL; INTRAMUSCULAR; INTRAVENOUS; SOFT TISSUE
Status: DISCONTINUED | OUTPATIENT
Start: 2024-01-14 | End: 2024-01-14

## 2024-01-14 RX ORDER — SIMETHICONE 80 MG
1 TABLET,CHEWABLE ORAL EVERY 6 HOURS PRN
Status: DISCONTINUED | OUTPATIENT
Start: 2024-01-14 | End: 2024-01-15 | Stop reason: HOSPADM

## 2024-01-14 RX ORDER — LIDOCAINE HCL/EPINEPHRINE/PF 2%-1:200K
VIAL (ML) INJECTION
Status: DISCONTINUED | OUTPATIENT
Start: 2024-01-14 | End: 2024-01-14

## 2024-01-14 RX ORDER — OXYTOCIN/RINGER'S LACTATE 30/500 ML
95 PLASTIC BAG, INJECTION (ML) INTRAVENOUS CONTINUOUS
Status: DISCONTINUED | OUTPATIENT
Start: 2024-01-14 | End: 2024-01-15 | Stop reason: HOSPADM

## 2024-01-14 RX ORDER — CEFAZOLIN SODIUM 1 G/3ML
INJECTION, POWDER, FOR SOLUTION INTRAMUSCULAR; INTRAVENOUS
Status: DISCONTINUED | OUTPATIENT
Start: 2024-01-14 | End: 2024-01-14

## 2024-01-14 RX ORDER — MISOPROSTOL 200 UG/1
800 TABLET ORAL ONCE AS NEEDED
Status: DISCONTINUED | OUTPATIENT
Start: 2024-01-14 | End: 2024-01-15 | Stop reason: HOSPADM

## 2024-01-14 RX ORDER — FAMOTIDINE 10 MG/ML
20 INJECTION INTRAVENOUS
Status: DISCONTINUED | OUTPATIENT
Start: 2024-01-14 | End: 2024-01-14

## 2024-01-14 RX ORDER — KETOROLAC TROMETHAMINE 30 MG/ML
30 INJECTION, SOLUTION INTRAMUSCULAR; INTRAVENOUS
Status: DISCONTINUED | OUTPATIENT
Start: 2024-01-14 | End: 2024-01-14

## 2024-01-14 RX ORDER — OXYTOCIN/RINGER'S LACTATE 30/500 ML
PLASTIC BAG, INJECTION (ML) INTRAVENOUS
Status: DISCONTINUED | OUTPATIENT
Start: 2024-01-14 | End: 2024-01-14

## 2024-01-14 RX ORDER — PHENYLEPHRINE HYDROCHLORIDE 10 MG/ML
INJECTION INTRAVENOUS
Status: DISCONTINUED | OUTPATIENT
Start: 2024-01-14 | End: 2024-01-14

## 2024-01-14 RX ORDER — IBUPROFEN 800 MG/1
800 TABLET ORAL EVERY 8 HOURS
Status: DISCONTINUED | OUTPATIENT
Start: 2024-01-14 | End: 2024-01-15 | Stop reason: HOSPADM

## 2024-01-14 RX ORDER — FAMOTIDINE 10 MG/ML
INJECTION INTRAVENOUS
Status: COMPLETED
Start: 2024-01-14 | End: 2024-01-14

## 2024-01-14 RX ORDER — ACETAMINOPHEN 500 MG
1000 TABLET ORAL ONCE
Status: COMPLETED | OUTPATIENT
Start: 2024-01-14 | End: 2024-01-14

## 2024-01-14 RX ORDER — PROCHLORPERAZINE EDISYLATE 5 MG/ML
5 INJECTION INTRAMUSCULAR; INTRAVENOUS EVERY 6 HOURS PRN
Status: DISCONTINUED | OUTPATIENT
Start: 2024-01-14 | End: 2024-01-15 | Stop reason: HOSPADM

## 2024-01-14 RX ORDER — SODIUM CHLORIDE, SODIUM LACTATE, POTASSIUM CHLORIDE, CALCIUM CHLORIDE 600; 310; 30; 20 MG/100ML; MG/100ML; MG/100ML; MG/100ML
INJECTION, SOLUTION INTRAVENOUS CONTINUOUS
Status: DISCONTINUED | OUTPATIENT
Start: 2024-01-14 | End: 2024-01-14

## 2024-01-14 RX ORDER — AMOXICILLIN 250 MG
1 CAPSULE ORAL NIGHTLY PRN
Status: DISCONTINUED | OUTPATIENT
Start: 2024-01-14 | End: 2024-01-15 | Stop reason: HOSPADM

## 2024-01-14 RX ORDER — ACETAMINOPHEN 325 MG/1
650 TABLET ORAL
Status: DISCONTINUED | OUTPATIENT
Start: 2024-01-14 | End: 2024-01-15 | Stop reason: HOSPADM

## 2024-01-14 RX ORDER — OXYCODONE HYDROCHLORIDE 5 MG/1
5 TABLET ORAL EVERY 4 HOURS PRN
Status: DISCONTINUED | OUTPATIENT
Start: 2024-01-14 | End: 2024-01-15 | Stop reason: HOSPADM

## 2024-01-14 RX ORDER — NALBUPHINE HYDROCHLORIDE 10 MG/ML
5 INJECTION, SOLUTION INTRAMUSCULAR; INTRAVENOUS; SUBCUTANEOUS ONCE AS NEEDED
Status: DISCONTINUED | OUTPATIENT
Start: 2024-01-14 | End: 2024-01-15 | Stop reason: HOSPADM

## 2024-01-14 RX ORDER — DIPHENHYDRAMINE HCL 25 MG
25 CAPSULE ORAL EVERY 6 HOURS PRN
Status: DISCONTINUED | OUTPATIENT
Start: 2024-01-14 | End: 2024-01-15 | Stop reason: HOSPADM

## 2024-01-14 RX ORDER — DIPHENHYDRAMINE HYDROCHLORIDE 50 MG/ML
12.5 INJECTION INTRAMUSCULAR; INTRAVENOUS
Status: DISCONTINUED | OUTPATIENT
Start: 2024-01-14 | End: 2024-01-15 | Stop reason: HOSPADM

## 2024-01-14 RX ORDER — IBUPROFEN 800 MG/1
800 TABLET ORAL EVERY 8 HOURS
Status: DISCONTINUED | OUTPATIENT
Start: 2024-01-15 | End: 2024-01-14

## 2024-01-14 RX ORDER — TRANEXAMIC ACID 10 MG/ML
1000 INJECTION, SOLUTION INTRAVENOUS EVERY 30 MIN PRN
Status: DISCONTINUED | OUTPATIENT
Start: 2024-01-14 | End: 2024-01-15 | Stop reason: HOSPADM

## 2024-01-14 RX ORDER — SODIUM CHLORIDE 9 MG/ML
INJECTION, SOLUTION INTRAVENOUS ONCE
Status: COMPLETED | OUTPATIENT
Start: 2024-01-14 | End: 2024-01-14

## 2024-01-14 RX ORDER — DOCUSATE SODIUM 100 MG/1
200 CAPSULE, LIQUID FILLED ORAL 2 TIMES DAILY
Status: DISCONTINUED | OUTPATIENT
Start: 2024-01-14 | End: 2024-01-15 | Stop reason: HOSPADM

## 2024-01-14 RX ORDER — GENTAMICIN 40 MG/ML
INJECTION, SOLUTION INTRAMUSCULAR; INTRAVENOUS
Status: COMPLETED
Start: 2024-01-14 | End: 2024-01-14

## 2024-01-14 RX ORDER — PRENATAL WITH FERROUS FUM AND FOLIC ACID 3080; 920; 120; 400; 22; 1.84; 3; 20; 10; 1; 12; 200; 27; 25; 2 [IU]/1; [IU]/1; MG/1; [IU]/1; MG/1; MG/1; MG/1; MG/1; MG/1; MG/1; UG/1; MG/1; MG/1; MG/1; MG/1
1 TABLET ORAL DAILY
Status: DISCONTINUED | OUTPATIENT
Start: 2024-01-14 | End: 2024-01-15 | Stop reason: HOSPADM

## 2024-01-14 RX ORDER — ONDANSETRON HYDROCHLORIDE 2 MG/ML
4 INJECTION, SOLUTION INTRAVENOUS EVERY 6 HOURS PRN
Status: DISCONTINUED | OUTPATIENT
Start: 2024-01-14 | End: 2024-01-15 | Stop reason: HOSPADM

## 2024-01-14 RX ORDER — METHYLERGONOVINE MALEATE 0.2 MG/ML
200 INJECTION INTRAVENOUS ONCE AS NEEDED
Status: DISCONTINUED | OUTPATIENT
Start: 2024-01-14 | End: 2024-01-15 | Stop reason: HOSPADM

## 2024-01-14 RX ORDER — HYDROCORTISONE 25 MG/G
CREAM TOPICAL 3 TIMES DAILY PRN
Status: DISCONTINUED | OUTPATIENT
Start: 2024-01-14 | End: 2024-01-15 | Stop reason: HOSPADM

## 2024-01-14 RX ORDER — ENOXAPARIN SODIUM 100 MG/ML
40 INJECTION SUBCUTANEOUS EVERY 24 HOURS
Status: DISCONTINUED | OUTPATIENT
Start: 2024-01-14 | End: 2024-01-15 | Stop reason: HOSPADM

## 2024-01-14 RX ORDER — ACETAMINOPHEN 500 MG
1000 TABLET ORAL
Status: DISCONTINUED | OUTPATIENT
Start: 2024-01-14 | End: 2024-01-14

## 2024-01-14 RX ORDER — SODIUM CITRATE AND CITRIC ACID MONOHYDRATE 334; 500 MG/5ML; MG/5ML
SOLUTION ORAL
Status: COMPLETED
Start: 2024-01-14 | End: 2024-01-14

## 2024-01-14 RX ORDER — SODIUM CITRATE AND CITRIC ACID MONOHYDRATE 334; 500 MG/5ML; MG/5ML
30 SOLUTION ORAL
Status: DISCONTINUED | OUTPATIENT
Start: 2024-01-14 | End: 2024-01-14

## 2024-01-14 RX ORDER — OXYCODONE HYDROCHLORIDE 5 MG/1
10 TABLET ORAL EVERY 4 HOURS PRN
Status: DISCONTINUED | OUTPATIENT
Start: 2024-01-14 | End: 2024-01-15 | Stop reason: HOSPADM

## 2024-01-14 RX ORDER — CARBOPROST TROMETHAMINE 250 UG/ML
250 INJECTION, SOLUTION INTRAMUSCULAR
Status: DISCONTINUED | OUTPATIENT
Start: 2024-01-14 | End: 2024-01-15 | Stop reason: HOSPADM

## 2024-01-14 RX ORDER — ONDANSETRON 8 MG/1
8 TABLET, ORALLY DISINTEGRATING ORAL EVERY 8 HOURS PRN
Status: DISCONTINUED | OUTPATIENT
Start: 2024-01-14 | End: 2024-01-15 | Stop reason: HOSPADM

## 2024-01-14 RX ORDER — SODIUM CHLORIDE, SODIUM LACTATE, POTASSIUM CHLORIDE, CALCIUM CHLORIDE 600; 310; 30; 20 MG/100ML; MG/100ML; MG/100ML; MG/100ML
INJECTION, SOLUTION INTRAVENOUS CONTINUOUS
Status: DISCONTINUED | OUTPATIENT
Start: 2024-01-14 | End: 2024-01-15 | Stop reason: HOSPADM

## 2024-01-14 RX ORDER — IBUPROFEN 800 MG/1
800 TABLET ORAL
Status: DISCONTINUED | OUTPATIENT
Start: 2024-01-15 | End: 2024-01-14

## 2024-01-14 RX ADMIN — IBUPROFEN 800 MG: 800 TABLET, FILM COATED ORAL at 09:01

## 2024-01-14 RX ADMIN — GENTAMICIN SULFATE 336.5 MG: 10 INJECTION, SOLUTION INTRAMUSCULAR; INTRAVENOUS at 02:01

## 2024-01-14 RX ADMIN — PHENYLEPHRINE HYDROCHLORIDE 100 MCG: 10 INJECTION INTRAVENOUS at 04:01

## 2024-01-14 RX ADMIN — Medication 2 MILLI-UNITS/MIN: at 01:01

## 2024-01-14 RX ADMIN — SODIUM CITRATE AND CITRIC ACID MONOHYDRATE 30 ML: 500; 334 SOLUTION ORAL at 04:01

## 2024-01-14 RX ADMIN — GENTAMICIN SULFATE: 40 INJECTION, SOLUTION INTRAMUSCULAR; INTRAVENOUS at 02:01

## 2024-01-14 RX ADMIN — CEFAZOLIN 2 G: 2 INJECTION, POWDER, FOR SOLUTION INTRAMUSCULAR; INTRAVENOUS at 04:01

## 2024-01-14 RX ADMIN — SODIUM CHLORIDE, POTASSIUM CHLORIDE, SODIUM LACTATE AND CALCIUM CHLORIDE: 600; 310; 30; 20 INJECTION, SOLUTION INTRAVENOUS at 03:01

## 2024-01-14 RX ADMIN — Medication 1 ML: at 04:01

## 2024-01-14 RX ADMIN — DEXAMETHASONE SODIUM PHOSPHATE 8 MG: 4 INJECTION, SOLUTION INTRAMUSCULAR; INTRAVENOUS at 04:01

## 2024-01-14 RX ADMIN — CALCIUM CARBONATE (ANTACID) CHEW TAB 500 MG 500 MG: 500 CHEW TAB at 12:01

## 2024-01-14 RX ADMIN — ENOXAPARIN SODIUM 40 MG: 100 INJECTION SUBCUTANEOUS at 04:01

## 2024-01-14 RX ADMIN — SODIUM CHLORIDE: 9 INJECTION, SOLUTION INTRAVENOUS at 01:01

## 2024-01-14 RX ADMIN — ROPIVACAINE HYDROCHLORIDE 5 ML: 5 INJECTION, SOLUTION EPIDURAL; INFILTRATION; PERINEURAL at 04:01

## 2024-01-14 RX ADMIN — ACETAMINOPHEN 1000 MG: 500 TABLET ORAL at 02:01

## 2024-01-14 RX ADMIN — FAMOTIDINE 20 MG: 10 INJECTION INTRAVENOUS at 04:01

## 2024-01-14 RX ADMIN — DEXTROSE 500 MG: 5 SOLUTION INTRAVENOUS at 04:01

## 2024-01-14 RX ADMIN — OXYTOCIN 10 UNITS: 10 INJECTION INTRAVENOUS at 05:01

## 2024-01-14 RX ADMIN — ONDANSETRON 8 MG: 2 INJECTION INTRAMUSCULAR; INTRAVENOUS at 04:01

## 2024-01-14 RX ADMIN — ACETAMINOPHEN 325MG 650 MG: 325 TABLET ORAL at 04:01

## 2024-01-14 RX ADMIN — SODIUM CHLORIDE, SODIUM LACTATE, POTASSIUM CHLORIDE, AND CALCIUM CHLORIDE: .6; .31; .03; .02 INJECTION, SOLUTION INTRAVENOUS at 04:01

## 2024-01-14 RX ADMIN — FENTANYL CITRATE 100 MCG: 50 INJECTION, SOLUTION INTRAMUSCULAR; INTRAVENOUS at 04:01

## 2024-01-14 RX ADMIN — ACETAMINOPHEN 325MG 650 MG: 325 TABLET ORAL at 10:01

## 2024-01-14 RX ADMIN — LIDOCAINE HYDROCHLORIDE,EPINEPHRINE BITARTRATE 5 ML: 20; .005 INJECTION, SOLUTION EPIDURAL; INFILTRATION; INTRACAUDAL; PERINEURAL at 04:01

## 2024-01-14 RX ADMIN — KETOROLAC TROMETHAMINE 30 MG: 30 INJECTION, SOLUTION INTRAMUSCULAR; INTRAVENOUS at 06:01

## 2024-01-14 RX ADMIN — OXYCODONE HYDROCHLORIDE 10 MG: 5 TABLET ORAL at 08:01

## 2024-01-14 RX ADMIN — AMPICILLIN SODIUM 2 G: 2 INJECTION, POWDER, FOR SOLUTION INTRAMUSCULAR; INTRAVENOUS at 01:01

## 2024-01-14 RX ADMIN — Medication 250 ML: at 05:01

## 2024-01-14 RX ADMIN — KETOROLAC TROMETHAMINE 30 MG: 30 INJECTION, SOLUTION INTRAMUSCULAR; INTRAVENOUS at 12:01

## 2024-01-14 NOTE — SUBJECTIVE & OBJECTIVE
Obstetric HPI:  Patient reports moderate contractions, active fetal movement, No vaginal bleeding , No loss of fluid     This pregnancy has been complicated by none    OB History    Para Term  AB Living   1 0 0 0 0 0   SAB IAB Ectopic Multiple Live Births   0 0 0 0 0      # Outcome Date GA Lbr Holden/2nd Weight Sex Delivery Anes PTL Lv   1 Current              Past Medical History:   Diagnosis Date    ADHD (attention deficit hyperactivity disorder), combined type 2018    Jasper Goldberg 81    Adolescent idiopathic scoliosis of thoracolumbar region     Agoraphobia with panic attacks     Anxiety hyperventilation     Chronic seasonal allergic rhinitis     COVID-19 10/24/2022    Hx of infectious mononucleosis     Obsessive compulsive disorder     PCR DNA positive for HSV1      No past surgical history on file.    PTA Medications   Medication Sig    prenatal 105-iron-folic ac-dha 30 mg iron- 1.4 mg-300 mg Cmpk Take by mouth.    busPIRone (BUSPAR) 5 MG Tab Take 1 tablet (5 mg total) by mouth 2 (two) times daily. (Patient taking differently: Take 5 mg by mouth 2 (two) times daily as needed.)       Review of patient's allergies indicates:  No Known Allergies     Family History       Problem Relation (Age of Onset)    ADD / ADHD Mother    Anxiety disorder Mother    Arthritis Maternal Grandmother    Blindness Father    Cancer Maternal Grandfather    Endometriosis Mother    Heart attack Maternal Uncle, Maternal Grandfather    Heart disease Maternal Grandmother    Hypertension Mother    Metabolic syndrome Mother    Miscarriages / Stillbirths Mother    Obesity Mother    Polycystic ovary syndrome Mother    Stroke Maternal Uncle          Tobacco Use    Smoking status: Never    Smokeless tobacco: Never   Substance and Sexual Activity    Alcohol use: Not Currently     Comment: once every 2 years    Drug use: Yes     Types: Amphetamines    Sexual activity: Yes     Partners: Male     Birth control/protection: None      Review of Systems   Constitutional:  Negative for activity change, appetite change, chills, diaphoresis, fatigue, fever and unexpected weight change.   HENT:  Negative for mouth sores and tinnitus.    Eyes:  Negative for discharge and visual disturbance.   Respiratory:  Negative for cough, shortness of breath and wheezing.    Cardiovascular:  Negative for chest pain, palpitations and leg swelling.   Gastrointestinal:  Positive for abdominal pain. Negative for bloating, blood in stool, constipation, diarrhea, nausea and vomiting.   Endocrine: Negative for diabetes, hair loss, hot flashes, hyperthyroidism and hypothyroidism.   Genitourinary:  Negative for dysuria, flank pain, frequency, genital sores, hematuria, urgency, vaginal bleeding, vaginal discharge, vaginal pain, postcoital bleeding and vaginal odor.   Musculoskeletal:  Negative for back pain, joint swelling and myalgias.   Integumentary:  Negative for rash, acne, breast mass, nipple discharge and breast skin changes.   Neurological:  Negative for seizures, syncope, numbness and headaches.   Hematological:  Negative for adenopathy. Does not bruise/bleed easily.   Psychiatric/Behavioral:  Negative for depression and sleep disturbance. The patient is not nervous/anxious.    Breast: Negative for mass, mastodynia, nipple discharge and skin changes     Objective:     Vital Signs (Most Recent):  Temp: 99.3 °F (37.4 °C) (01/13/24 2213)  Pulse: 95 (01/13/24 2232)  Resp: 18 (01/13/24 1905)  BP: 118/63 (01/13/24 2231)  SpO2: 100 % (01/13/24 2231) Vital Signs (24h Range):  Temp:  [96.1 °F (35.6 °C)-99.3 °F (37.4 °C)] 99.3 °F (37.4 °C)  Pulse:  [] 95  Resp:  [18] 18  SpO2:  [97 %-100 %] 100 %  BP: ()/(51-79) 118/63     Weight: 93.2 kg (205 lb 5.7 oz)  Body mass index is 37.56 kg/m².    FHT: 140 Cat 1 (reassuring)  TOCO:  Q 3-7 minutes     Physical Exam:   Constitutional: She is oriented to person, place, and time. She appears well-developed and  well-nourished.    HENT:   Head: Normocephalic and atraumatic.    Eyes: Pupils are equal, round, and reactive to light. EOM are normal.     Cardiovascular:  Normal rate and regular rhythm.             Pulmonary/Chest: Effort normal and breath sounds normal.        Abdominal: Soft. Bowel sounds are normal.   Gravid     Genitourinary:    Vagina and uterus normal.             Musculoskeletal: Normal range of motion and moves all extremeties.       Neurological: She is alert and oriented to person, place, and time.    Skin: Skin is warm and dry.    Psychiatric: She has a normal mood and affect.        Cervix:  Dilation:  4  Effacement:  50%  Station: -3  Presentation: Vertex     Significant Labs:  Lab Results   Component Value Date    GROUPTRH O POS 01/13/2024    HEPBSAG Non-reactive 05/16/2023    STREPBCULT No Group B Streptococcus isolated 12/13/2023       I have personallly reviewed all pertinent lab results from the last 24 hours.

## 2024-01-14 NOTE — ANESTHESIA PREPROCEDURE EVALUATION
01/13/2024  Xuan Quintanilla is a 28 y.o., female.    Recent Labs   Lab 01/13/24  1649   WBC 19.17*   RBC 4.45   HGB 13.4   HCT 38.2      MCV 86   MCH 30.1   MCHC 35.1     Past Medical History:   Diagnosis Date    ADHD (attention deficit hyperactivity disorder), combined type 03/2018    Jasper Goldberg 81    Adolescent idiopathic scoliosis of thoracolumbar region     Agoraphobia with panic attacks     Anxiety hyperventilation     Chronic seasonal allergic rhinitis     COVID-19 10/24/2022    Hx of infectious mononucleosis     Obsessive compulsive disorder     PCR DNA positive for HSV1      No past surgical history on file.    Pre-op Assessment    I have reviewed the Patient Summary Reports.     I have reviewed the Nursing Notes. I have reviewed the NPO Status.   I have reviewed the Medications.     Review of Systems  Anesthesia Hx:  No problems with previous Anesthesia   History of prior surgery of interest to airway management or planning:            Denies Personal Hx of Anesthesia complications.                    Social:  Non-Smoker, No Alcohol Use       Hematology/Oncology:  Hematology Normal   Oncology Normal                                   EENT/Dental:  EENT/Dental Normal           Cardiovascular:  Cardiovascular Normal Exercise tolerance: good                                           Renal/:  Renal/ Normal                 Hepatic/GI:  Hepatic/GI Normal                 Musculoskeletal:  Musculoskeletal Normal                Neurological:  Neurology Normal                                      Endocrine:  Endocrine Normal            Dermatological:  Skin Normal    Psych:  Psychiatric History                  Physical Exam  General: Well nourished and Cooperative    Airway:  Mallampati: II   Mouth Opening: Normal  TM Distance: Normal  Tongue: Normal  Neck ROM: Normal  ROM    Dental:  Intact        Anesthesia Plan  Type of Anesthesia, risks & benefits discussed:    Anesthesia Type: Epidural  Intra-op Monitoring Plan: Standard ASA Monitors  Post Op Pain Control Plan: epidural analgesia  Informed Consent: Informed consent signed with the Patient and all parties understand the risks and agree with anesthesia plan.  All questions answered. Patient consented to blood products? Yes  ASA Score: 2  Day of Surgery Review of History & Physical: H&P Update referred to the surgeon/provider.I have interviewed and examined the patient. I have reviewed the patient's H&P dated: 1/13/24. There are no significant changes.     Ready For Surgery From Anesthesia Perspective.     .

## 2024-01-14 NOTE — SIGNIFICANT EVENT
Fetal tachycardia noted with maternal temp of 100.2.     SVE: 7/90/-3; fetal head not engaged. IUPC placed.     Chorioamnionitis  Protracted labor    Amnioinfusion  Pitocin per protocol  Start Amp/Gent

## 2024-01-14 NOTE — HPI
Patient presents to L&D with complaints of contractions. Good FM. No LOF. No VB. Primary  section performed due to fetal intolerance to labor remote from delivery.   POD#1: patient is doing well and would like to be discharged to home

## 2024-01-14 NOTE — NURSING
MD Lorenzo contacted via telephone with report on fetal strip with minimal/moderate variability and recurrent late decelerations despite intrauterine resuscitation interventions. No cervical change noted. C/S called per MD. Patient to get ready for surgery.

## 2024-01-14 NOTE — NURSING
Patient visualized awake and alert, in no distress during bedside report. V/S stable. Patient comfortable after epidural procedure. POC discussed. Verbalizes understanding. Denies any needs at this time.

## 2024-01-14 NOTE — PLAN OF CARE
[facility] - Labor & Delivery  Discharge Assessment    Primary Care Provider: Adilene Barker MD     OB Screen (most recent)       OB Screen - 24 1212          OB SCREEN    Assessment Type Discharge Planning Brief Assessment (P)      Source of Information health record (P)      Received Prenatal Care Yes (P)      Any indications/suspicions for None (P)      Is this a teen pregnancy No (P)      Is the baby in NICU No (P)      Indication for adoption/[facility] Haven No (P)      Indication for DME/post-acute needs No (P)      HIV (+) No (P)      Any congenital  disorders No (P)      Fetal demise/ death No (P)                      This patient has been screened for Case Management needs.  Based on documentation in medical record, no needs are anticipated at this time. Despite the initial pre-kaitlyn visit documentation and the H&P from this admission stating patient has drug use : Amphetamines, not UDS on file. CM did reach out to OBGYN who states patient is not currently abusing amphetamines, and was screened at every visit. Therefore no intervention needed from a CM standpoint. Case Management/Social Work remains available if a need arises, please enter consult for assistance.  For urgent needs contact Case Management Department/on-call at:  [phone].

## 2024-01-14 NOTE — PLAN OF CARE
Pt stable. Vital signs WNL. Tolerating regular diet well. Voiding spontaneously and without difficulty. Fundus firm without massage, 2 below umbilicus, midline, light bleeding. Pain adequately controlled with scheduled meds. S/O at bedside, supportive of and attentive to pt and infant, bonding well with pt and infant. Pt bonding well with infant and responding to infant cues.

## 2024-01-14 NOTE — NURSING
MD Lorenzo and DAYLIN Hendricks at bedside for pre-procedure and discussion of POC. Medications given. Vaginal cleanse performed.

## 2024-01-14 NOTE — NURSING
Patient states that she is okay with getting pitocin after delivery but would prefer to not receive pitocin at this time. Notified Dr. Ventura.

## 2024-01-14 NOTE — OP NOTE
Date: 24    Procedure: Primary low transverse  section    Surgeon: Estella Pino MD    Pre-op Dx:   1. IUP at 39 5/7 WGA  2. Chorioamnionitis  3. Fetal intolerance to labor    Post-op Dx: same    Anesthesia: epidural    QBL: 540 cc    DVT prophylaxis: SCDs    Perioperative antibiotics: Ancef/Azithro (patient previously dosed with Gent/Amp while in labor)    Specimen: none    Operative Findings: Normal appearing uterus, tubes, and ovaries. Male infant in the vertex presentation. Thick meconium noted. Apgars 4/8.      OPERATIVE NOTE:  The patient was taken to the operating room were epidural anesthesia was found to be adequate. She was prepped and draped in the normal sterile fashion in the dorsal supine position. A estrada catheter was used to drain the bladder. Adequate anesthesia was assured. A pfannenstiel incision was then made with the scalpel. The Bovie was then used through the underlying layers of tissue and an incision was made through the fascia. The facial incision was extended laterally with the curved peña scissors. The anterior aspect of the fascia was grasped with [facility] clamps and dissected off the rectus muscles with both scarp and blunt dissection. The inferior aspect of the facial incision was dissected free in a similar fashion. The assistant aided in retraction and exposure on entering the abdomen. The rectus muscles were  in the midline and the peritoneum was entered sharply. The bladder blade was placed into the abdomen and a bladder flap was created. The bladder blade was repositioned to mobilize the bladder free of the lower uterine segment. The lower uterine segment was then incised in a transverse fashion and extended bluntly. The infant's head was then grasped and brought to the incision. The assistant applied gentle fundal pressure and the infant was delivered without difficulty. The infant's cord was clamped and cut and nose/mouth suctioned and the infant was  handed off to the awaiting pediatrician. Cord blood was then obtained for the infant's blood type. The placenta was then removed and the uterus was exteriorized. The uterus was cleaned of all clot and debris.  The assistant provided visualization/traction while the uterine incision was closed in a double layer fashion with 0 PDS. The bladder flap was irrigated with saline until clear and sutured closed with a running stitch of 2-0 Vicyrl. The uterus was placed back in the abdomen and the pelvic gutters were irrigated with sterile saline. The peritoneum was closed and the rectus muscle was re-approximated. The fascia was closed with 0 vicryl in a running fashion. The skin was closed with vicryl.  The patient tolerated the procedure well. Instrument, needle, and lap counts were correct x2. The patient was taken to recovery room in stable condition.

## 2024-01-14 NOTE — ANESTHESIA PROCEDURE NOTES
Epidural    Patient location during procedure: OB   Reason for block: primary anesthetic   Reason for block: labor analgesia requested by patient and obstetrician  Diagnosis: labor   Start time: 1/13/2024 6:14 PM  Timeout: 1/13/2024 6:10 PM  End time: 1/13/2024 6:19 PM  Surgery related to: pregnancy    Staffing  Performing Provider: Elaido Belcher CRNA  Authorizing Provider: Eladio Belcher CRNA    Staffing  Performed by: Eladio Belcher CRNA  Authorized by: Eladio Belcher CRNA        Preanesthetic Checklist  Completed: patient identified, IV checked, site marked, risks and benefits discussed, surgical consent, monitors and equipment checked, pre-op evaluation, timeout performed, anesthesia consent given, hand hygiene performed and patient being monitored  Preparation  Patient position: sitting  Prep: ChloraPrep  Patient monitoring: Pulse Ox and Blood Pressure  Reason for block: primary anesthetic   Epidural  Skin Anesthetic: lidocaine 1%  Skin Wheal: 3 mL  Administration type: continuous  Approach: midline  Interspace: L3-4    Injection technique: KARTHIKEYAN saline  Needle and Epidural Catheter  Needle type: Tuohy   Needle gauge: 18  Needle length: 3.5 inches  Needle insertion depth: 7 cm  Catheter type: multi-orifice  Catheter size: 20 G  Catheter at skin depth: 13 cm  Insertion Attempts: 1  Test dose: 5 mL of lidocaine 1.5% with Epi 1-to-200,000  Additional Documentation: incremental injection, negative aspiration for heme and CSF, no paresthesia on injection, no signs/symptoms of IV or SA injection, no significant complaints from patient and no significant pain on injection  Needle localization: anatomical landmarks  Medications:  Volume per aspiration: 2.5 mL  Time between aspirations: 3 minutes   Assessment  Upper dermatomal levels - Left: T10  Right: T10   Dermatomal levels determined by pinch or prick  Ease of block: easy  Patient's tolerance of the procedure: comfortable throughout block and no  complaints No inadvertent dural puncture with Tuohy.  Dural puncture performed with spinal needle.

## 2024-01-14 NOTE — NURSING
MD contacted with updated SVE and non-reassuring fetal strip review. MD to come evaluate patient at bedside. MD also notified of maternal fever and interventions performed.

## 2024-01-14 NOTE — LACTATION NOTE
This note was copied from a baby's chart.  Mother has been breastfeeding NB without assist, using football position. Encouraged to call for assist, and feedings of 8 or more q 24 hours.

## 2024-01-14 NOTE — PROGRESS NOTES
Pharmacokinetic Initial Assessment: Gentamicin    Assessment:  Weight utilized for dose calculation: Adjusted Body Weight  Dosing method utilized: extended interval dosing    Plan: Extended interval dosing regimen: Gentamicin 336.4 mg IV once, followed by a random level to be drawn on 1/14 at 1200, 8-12 hours after the first dose.    Pharmacy will continue to monitor.    Please contact pharmacy with any questions regarding this assessment.    Thank you for the consult,    Armida Ramos       Patient brief summary:  Xuan Quintanilla is a 28 y.o. female initiated on aminoglycoside therapy for treatment of suspected chorioamnionitis     Drug Allergies:   Review of patient's allergies indicates:  No Known Allergies    Actual Body Weight:   93.2kg    Adjust Body Weight:   67.3kg    Ideal Body Weight:  50.1kg    Renal Function:   Estimated Creatinine Clearance: 109.2 mL/min (based on SCr of 0.8 mg/dL).,     Dialysis Method (if applicable):  N/A    CBC (last 72 hours):  Recent Labs   Lab Result Units 01/13/24  1649   WBC K/uL 19.17*   Hemoglobin g/dL 13.4   Hematocrit % 38.2   Platelets K/uL 174   Gran % % 86.5*   Lymph % % 7.0*   Mono % % 5.5   Eosinophil % % 0.1   Basophil % % 0.3   Differential Method  Automated       Metabolic Panel (last 72 hours):  Recent Labs   Lab Result Units 01/13/24  1649   Creatinine mg/dL 0.8       Microbiologic Results:  Microbiology Results (last 7 days)       ** No results found for the last 168 hours. **

## 2024-01-14 NOTE — SIGNIFICANT EVENT
Fetal tachycardia worsening; late decelerations noted with Pitocin. No cervical change. Will plan to proceed with primary CD.

## 2024-01-14 NOTE — PROGRESS NOTES
Therapy with gentamicin complete and/or consult discontinued by provider.  Pharmacy will sign off, please re-consult as needed.    Thanks for the consult!    Shirley Zavala, PharmD   [phone]

## 2024-01-14 NOTE — ANESTHESIA POSTPROCEDURE EVALUATION
Anesthesia Post Evaluation    Patient: Xuan Quintanilla    Procedure(s) Performed: Procedure(s) (LRB):   SECTION (N/A)    Final Anesthesia Type: epidural      Patient location during evaluation: labor & delivery  Patient participation: Yes- Able to Participate  Level of consciousness: awake and alert and oriented  Post-procedure vital signs: reviewed and stable  Pain management: adequate  Airway patency: patent    PONV status at discharge: No PONV  Anesthetic complications: no      Cardiovascular status: blood pressure returned to baseline, hemodynamically stable and stable  Respiratory status: unassisted, spontaneous ventilation and room air  Hydration status: euvolemic  Follow-up not needed.              Vitals Value Taken Time   /57 24 0535   Temp 38 °C (100.4 °F) 24 0353   Pulse 90 24 0535   Resp 18 24 1905   SpO2 97 % 24 0535   Vitals shown include unvalidated device data.      No case tracking events are documented in the log.      Pain/Joe Score: Pain Rating Prior to Med Admin: 0 (2024  2:09 AM)

## 2024-01-14 NOTE — ASSESSMENT & PLAN NOTE
Admit for labor management    Consents in chart    GBS negative    Patient cleared for Anesthesia:  Epidural    Anesthesia/Surgery risks, benefits, and alternative options discussed and understood by patient/family.

## 2024-01-14 NOTE — H&P
[facility] - Labor & Delivery  Obstetrics  History & Physical    Patient Name: Xuan Quintanilla  MRN: [id number]  Admission Date: 2024  Primary Care Provider: Adilene Barker MD    Subjective:     Principal Problem:<principal problem not specified>    History of Present Illness:  Patient presents to L&D with complaints of contractions. Good FM. No LOF. No VB.     Obstetric HPI:  Patient reports moderate contractions, active fetal movement, No vaginal bleeding , No loss of fluid     This pregnancy has been complicated by none    OB History    Para Term  AB Living   1 0 0 0 0 0   SAB IAB Ectopic Multiple Live Births   0 0 0 0 0      # Outcome Date GA Lbr Holden/2nd Weight Sex Delivery Anes PTL Lv   1 Current              Past Medical History:   Diagnosis Date    ADHD (attention deficit hyperactivity disorder), combined type 2018    Jasper Goldberg 81    Adolescent idiopathic scoliosis of thoracolumbar region     Agoraphobia with panic attacks     Anxiety hyperventilation     Chronic seasonal allergic rhinitis     COVID-19 10/24/2022    Hx of infectious mononucleosis     Obsessive compulsive disorder     PCR DNA positive for HSV1      No past surgical history on file.    PTA Medications   Medication Sig    prenatal 105-iron-folic ac-dha 30 mg iron- 1.4 mg-300 mg Cmpk Take by mouth.    busPIRone (BUSPAR) 5 MG Tab Take 1 tablet (5 mg total) by mouth 2 (two) times daily. (Patient taking differently: Take 5 mg by mouth 2 (two) times daily as needed.)       Review of patient's allergies indicates:  No Known Allergies     Family History       Problem Relation (Age of Onset)    ADD / ADHD Mother    Anxiety disorder Mother    Arthritis Maternal Grandmother    Blindness Father    Cancer Maternal Grandfather    Endometriosis Mother    Heart attack Maternal Uncle, Maternal Grandfather    Heart disease Maternal Grandmother    Hypertension Mother    Metabolic syndrome Mother    Miscarriages / Stillbirths Mother     Obesity Mother    Polycystic ovary syndrome Mother    Stroke Maternal Uncle          Tobacco Use    Smoking status: Never    Smokeless tobacco: Never   Substance and Sexual Activity    Alcohol use: Not Currently     Comment: once every 2 years    Drug use: Yes     Types: Amphetamines    Sexual activity: Yes     Partners: Male     Birth control/protection: None     Review of Systems   Constitutional:  Negative for activity change, appetite change, chills, diaphoresis, fatigue, fever and unexpected weight change.   HENT:  Negative for mouth sores and tinnitus.    Eyes:  Negative for discharge and visual disturbance.   Respiratory:  Negative for cough, shortness of breath and wheezing.    Cardiovascular:  Negative for chest pain, palpitations and leg swelling.   Gastrointestinal:  Positive for abdominal pain. Negative for bloating, blood in stool, constipation, diarrhea, nausea and vomiting.   Endocrine: Negative for diabetes, hair loss, hot flashes, hyperthyroidism and hypothyroidism.   Genitourinary:  Negative for dysuria, flank pain, frequency, genital sores, hematuria, urgency, vaginal bleeding, vaginal discharge, vaginal pain, postcoital bleeding and vaginal odor.   Musculoskeletal:  Negative for back pain, joint swelling and myalgias.   Integumentary:  Negative for rash, acne, breast mass, nipple discharge and breast skin changes.   Neurological:  Negative for seizures, syncope, numbness and headaches.   Hematological:  Negative for adenopathy. Does not bruise/bleed easily.   Psychiatric/Behavioral:  Negative for depression and sleep disturbance. The patient is not nervous/anxious.    Breast: Negative for mass, mastodynia, nipple discharge and skin changes     Objective:     Vital Signs (Most Recent):  Temp: 99.3 °F (37.4 °C) (01/13/24 2213)  Pulse: 95 (01/13/24 2232)  Resp: 18 (01/13/24 1905)  BP: 118/63 (01/13/24 2231)  SpO2: 100 % (01/13/24 2231) Vital Signs (24h Range):  Temp:  [96.1 °F (35.6 °C)-99.3 °F  (37.4 °C)] 99.3 °F (37.4 °C)  Pulse:  [] 95  Resp:  [18] 18  SpO2:  [97 %-100 %] 100 %  BP: ()/(51-79) 118/63     Weight: 93.2 kg (205 lb 5.7 oz)  Body mass index is 37.56 kg/m².    FHT: 140 Cat 1 (reassuring)  TOCO:  Q 3-7 minutes     Physical Exam:   Constitutional: She is oriented to person, place, and time. She appears well-developed and well-nourished.    HENT:   Head: Normocephalic and atraumatic.    Eyes: Pupils are equal, round, and reactive to light. EOM are normal.     Cardiovascular:  Normal rate and regular rhythm.             Pulmonary/Chest: Effort normal and breath sounds normal.        Abdominal: Soft. Bowel sounds are normal.   Gravid     Genitourinary:    Vagina and uterus normal.             Musculoskeletal: Normal range of motion and moves all extremeties.       Neurological: She is alert and oriented to person, place, and time.    Skin: Skin is warm and dry.    Psychiatric: She has a normal mood and affect.        Cervix:  Dilation:  4  Effacement:  50%  Station: -3  Presentation: Vertex     Significant Labs:  Lab Results   Component Value Date    GROUPTRH O POS 2024    HEPBSAG Non-reactive 2023    STREPBCULT No Group B Streptococcus isolated 2023       I have personallly reviewed all pertinent lab results from the last 24 hours.  Assessment/Plan:     28 y.o. female  at 39w5d for:    Normal labor  Admit for labor management    Consents in chart    GBS negative    Patient cleared for Anesthesia:  Epidural    Anesthesia/Surgery risks, benefits, and alternative options discussed and understood by patient/family.          Estella Pino MD  Obstetrics  [facility] - Labor & Delivery

## 2024-01-14 NOTE — TRANSFER OF CARE
"Anesthesia Transfer of Care Note    Patient: Xuan Quintanilla    Procedure(s) Performed: * No procedures listed *    Patient location: Labor and Delivery    Anesthesia Type: epidural    Transport from OR: Transported from OR on room air with adequate spontaneous ventilation    Post pain: adequate analgesia    Post assessment: no apparent anesthetic complications and tolerated procedure well    Post vital signs: stable    Level of consciousness: awake, alert and oriented    Nausea/Vomiting: no nausea/vomiting    Complications: none    Transfer of care protocol was followed      Last vitals: Visit Vitals  /66   Pulse 97   Temp (!) 38 °C (100.4 °F) (Axillary)   Resp 18   Ht 5' 2" (1.575 m)   Wt 93.2 kg (205 lb 5.7 oz)   LMP 04/10/2023 (Exact Date)   SpO2 100%   Breastfeeding No   BMI 37.56 kg/m²     "

## 2024-01-14 NOTE — HOSPITAL COURSE
HD#1 Admit for labor management; primary  section performed  HD#2/POD#1: doing well and meeting all milestones; discharge to home

## 2024-01-14 NOTE — PLAN OF CARE
Pt stable. Vital signs WNL. Patient has not eaten solid food yet. Voiding spontaneously and without difficulty. Fundus firm without massage, 2 cm below umbilicus, midline, moderate/light  bleeding. Pain adequately controlled with IV Toradol. Family at bedside, supportive of and attentive to pt and infant, bonding well with pt and infant. Pt bonding well with infant and responding to infant cues.     Problem: Adult Inpatient Plan of Care  Goal: Plan of Care Review  Outcome: Ongoing, Progressing  Goal: Patient-Specific Goal (Individualized)  Outcome: Ongoing, Progressing  Goal: Absence of Hospital-Acquired Illness or Injury  Outcome: Ongoing, Progressing  Goal: Optimal Comfort and Wellbeing  Outcome: Ongoing, Progressing  Goal: Readiness for Transition of Care  Outcome: Ongoing, Progressing     Problem:  Fall Injury Risk  Goal: Absence of Fall, Infant Drop and Related Injury  Outcome: Ongoing, Progressing     Problem: Infection  Goal: Absence of Infection Signs and Symptoms  Outcome: Ongoing, Progressing

## 2024-01-15 VITALS
DIASTOLIC BLOOD PRESSURE: 71 MMHG | HEART RATE: 80 BPM | WEIGHT: 205.38 LBS | HEIGHT: 62 IN | RESPIRATION RATE: 18 BRPM | OXYGEN SATURATION: 97 % | BODY MASS INDEX: 37.79 KG/M2 | SYSTOLIC BLOOD PRESSURE: 118 MMHG | TEMPERATURE: 96 F

## 2024-01-15 PROBLEM — Z98.891 STATUS POST PRIMARY LOW TRANSVERSE CESAREAN SECTION: Status: ACTIVE | Noted: 2024-01-15

## 2024-01-15 PROCEDURE — 59025 FETAL NON-STRESS TEST: CPT

## 2024-01-15 PROCEDURE — 25000003 PHARM REV CODE 250: Performed by: OBSTETRICS & GYNECOLOGY

## 2024-01-15 PROCEDURE — 99211 OFF/OP EST MAY X REQ PHY/QHP: CPT | Mod: 25

## 2024-01-15 PROCEDURE — 99238 HOSP IP/OBS DSCHRG MGMT 30/<: CPT | Mod: ,,, | Performed by: STUDENT IN AN ORGANIZED HEALTH CARE EDUCATION/TRAINING PROGRAM

## 2024-01-15 RX ORDER — OXYCODONE HYDROCHLORIDE 5 MG/1
5 TABLET ORAL EVERY 6 HOURS PRN
Qty: 16 TABLET | Refills: 0 | Status: SHIPPED | OUTPATIENT
Start: 2024-01-15 | End: 2024-01-19

## 2024-01-15 RX ORDER — IBUPROFEN 800 MG/1
800 TABLET ORAL 3 TIMES DAILY
Qty: 21 TABLET | Refills: 0 | Status: SHIPPED | OUTPATIENT
Start: 2024-01-15 | End: 2024-01-22

## 2024-01-15 RX ADMIN — IBUPROFEN 800 MG: 800 TABLET, FILM COATED ORAL at 06:01

## 2024-01-15 RX ADMIN — ACETAMINOPHEN 325MG 650 MG: 325 TABLET ORAL at 12:01

## 2024-01-15 NOTE — PLAN OF CARE
Mother reports that bf is going well. Plan is for d/c today. Basics / goals reviewed, see lactation note.

## 2024-01-15 NOTE — NURSING
Patient discharge instructions reviewed, all questions answered. Educated on follow up time with Dr. Ventura, patient v/u. Instructed to call for wheelchair when ready, patient and spouse waiting for family meal at lunch to [place] home.

## 2024-01-15 NOTE — LACTATION NOTE
01/15/24 0835   Maternal Assessment   Breast Size Issue none   Breast Shape Bilateral:;round   Breast Density Bilateral:;soft   Areola Bilateral:;elastic   Nipples Bilateral:;everted   Maternal Infant Feeding   Maternal Emotional State relaxed;independent   Infant Positioning clutch/football   Signs of Milk Transfer audible swallow;infant jaw motion present;suck/swallow ratio   Nipple Shape After Feeding, Right   (round, everted)   Latch Assistance no   Equipment Type   Breast Pump Type double electric, personal   Community Referrals   Community Referrals outpatient lactation program;pediatric care provider;support group   Outpatient Lactation Program Lactation Follow-up Date/Time as needed / desired   Pediatric Care Provider Lactation Follow-up Date/Time as scheduled / needed   Support Group Lactation Follow-up Date/Time if desired     Upon entering room infant bf to R side in football hold. Mother reports that bf is going well but she is a little concerned that infant has not voided or stooled in a while. Infant has had 2 voids and one stool since delivery. Infant actively bf, noted with wide gape, flanged lips and strong rhythmic pulls with audible swallows. Mother reports some tenderness to nipples. R side is less painful so mother has been bf more to R side as L heals. Cracking and some redness noted to R nipple and bruise noted to tip of L. Mother reports that while bf latch started to become a little uncomfortable, assisted in breaking latch, slight compression noted to tip. When trying to re-latch infant is uninterested. Hand expressed L side as mother bf to R. 1.25 ml EBL syringe fed by mother after bf. Basics reviewed.     Basic Breastfeeding Instructions    The more you nurse the baby the more milk you will make.  Avoid bottles and pacifiers for the first 4 weeks.  Feed your baby only breastmik for the first 6 months.  Feed your baby at the earliest sign of hunger or comfort:  Sucking on fingers or  hands  Bringing hands toward his mouth  Rooting or reaching for something to suck on  Sucking motions with mouth  Fretful noises  Crying is a late sign of hunger or comfort.  The baby should be positioned and latched on to the breast correctly  Chest-to-chest, chin in the breast  Babys lips are flipped outward  Babys mouth is stretched open wide like a shout  Babys sucking should feel like tugging to the mother  - The baby should be drinking at the breast  You should hear an occasional swallow during the feeding  Switch breasts when the baby takes himself off the breast or falls asleep  Keep offering breasts until the baby looks full, no longer gives hunger signs, and stays asleep when placed on his back in the crib  - If the baby is sleepy and wont wake for a feeding, put the baby skin-to-skin dressed in a diaper against the mothers bare chest  - Sleep with your baby near you in the hospital room  - Call the nurse for additional assistance as needed.    Encouraged to continue feeding with cues, waking as / if needed to ensure 8 or more in 24. Reviewed phases of milk, supply, cluster feeding / second night and I/O goals. Parents questions answered, encouraged to call with any needs, v/u.

## 2024-01-15 NOTE — PROGRESS NOTES
[facility] - Labor & Delivery  Obstetrics  Postpartum Progress Note    Patient Name: Xuan Quintanilla  MRN: [id number]  Admission Date: 2024  Hospital Length of Stay: 2 days  Attending Physician: Estella Pino, *  Primary Care Provider: Adilene Barker MD    Subjective:     Principal Problem:Normal labor    Hospital Course:  HD#1 Admit for labor management; primary  section performed  HD#2/POD#1: doing well and meeting all milestones; discharge to home     Interval History: POD#1    She is doing well this morning. She is tolerating a regular diet without nausea or vomiting. She is voiding spontaneously. She is ambulating. She has passed flatus, and has not a BM. Vaginal bleeding is mild. She denies fever or chills. Abdominal pain is mild and controlled with oral medications. She Is breastfeeding. She desires circumcision for her male baby: no.    Objective:     Vital Signs (Most Recent):  Temp: 96.6 °F (35.9 °C) (01/15/24 0643)  Pulse: 75 (01/15/24 0643)  Resp: 16 (01/15/24 0643)  BP: 117/70 (01/15/24 0643)  SpO2: 99 % (01/15/24 0643) Vital Signs (24h Range):  Temp:  [96.6 °F (35.9 °C)-98.6 °F (37 °C)] 96.6 °F (35.9 °C)  Pulse:  [75-86] 75  Resp:  [16-18] 16  SpO2:  [97 %-99 %] 99 %  BP: (102-119)/(50-74) 117/70     Weight: 93.2 kg (205 lb 5.7 oz)  Body mass index is 37.56 kg/m².      Intake/Output Summary (Last 24 hours) at 1/15/2024 0851  Last data filed at 2024 1654  Gross per 24 hour   Intake --   Output 425 ml   Net -425 ml         Significant Labs:  Lab Results   Component Value Date    GROUPTRH O POS 2024    HEPBSAG Non-reactive 2023    STREPBCULT No Group B Streptococcus isolated 2023     Recent Labs   Lab 24  1649   HGB 13.4   HCT 38.2       I have personallly reviewed all pertinent lab results from the last 24 hours.  Recent Lab Results       None            Physical Exam:   Constitutional: She is oriented to person, place, and time. She appears well-developed  and well-nourished. No distress.    HENT:   Head: Normocephalic and atraumatic.    Eyes: Pupils are equal, round, and reactive to light. EOM are normal.     Cardiovascular:  Normal rate.             Pulmonary/Chest: Effort normal. No respiratory distress.        Abdominal: Soft. She exhibits abdominal incision (bandage in place). There is no abdominal tenderness.                 Neurological: She is alert and oriented to person, place, and time.     Psychiatric: She has a normal mood and affect. Her behavior is normal. Judgment and thought content normal.       Review of Systems   Constitutional:  Negative for appetite change, chills and fever.   Eyes:  Negative for visual disturbance.   Respiratory:  Negative for shortness of breath.    Cardiovascular:  Negative for chest pain.   Gastrointestinal:  Positive for abdominal pain. Negative for constipation, diarrhea, nausea and vomiting.   Genitourinary:  Positive for vaginal bleeding. Negative for dysmenorrhea and dyspareunia.   Musculoskeletal:  Negative for back pain.   Neurological:  Negative for syncope and headaches.   Psychiatric/Behavioral:  Negative for depression. The patient is not nervous/anxious.      Assessment/Plan:     28 y.o. female  for:    Status post primary low transverse  section  - patient is doing well and meeting all milestones; discharge to home        Disposition: As patient meets milestones, will plan to discharge to home .    Corinne Carpio MD  Obstetrics  [facility] - Labor & Delivery

## 2024-01-15 NOTE — DISCHARGE SUMMARY
[facility] - Labor & Delivery  Obstetrics  Discharge Summary      Patient Name: Xuan Quintanilla  MRN: [id number]  Admission Date: 2024  Hospital Length of Stay: 2 days  Discharge Date and Time:  01/15/2024 8:57 AM  Attending Physician: Cara Pino, *   Discharging Provider: Corinne Carpio MD   Primary Care Provider: Adilene Barker MD    HPI: Patient presents to L&D with complaints of contractions. Good FM. No LOF. No VB. Primary  section performed due to fetal intolerance to labor remote from delivery.   POD#1: patient is doing well and would like to be discharged to home        Procedure(s) (LRB):   SECTION (N/A)     Hospital Course:   HD#1 Admit for labor management; primary  section performed  HD#2/POD#1: doing well and meeting all milestones; discharge to home      Consults (From admission, onward)          Status Ordering Provider     Inpatient consult to Lactation  Once        Provider:  (Not yet assigned)    Acknowledged CARA PINO            Final Active Diagnoses:    Diagnosis Date Noted POA    PRINCIPAL PROBLEM:  Status post primary low transverse  section [Z98.891] 01/15/2024 Not Applicable    Normal labor [O80, Z37.9] 2024 Not Applicable    Chorioamnionitis in third trimester [O41.1230] 2024 No      Problems Resolved During this Admission:        Significant Diagnostic Studies: N/A      Feeding Method: breast    Immunizations       Date Immunization Status Dose Route/Site Given by    24 0807 MMR Deleted 0.5 mL Subcutaneous/     24 0607 Tdap Incomplete 0.5 mL Intramuscular/             Delivery:    Episiotomy:     Lacerations:     Repair suture:     Repair # of packets:     Blood loss (ml):       Birth information:  YOB: 2026   Time of birth: 4:45 AM   Sex: male   Delivery type: , Low Transverse   Gestational Age: 39w5d     Measurements    Weight: 3459 g  Weight (lbs): 7 lb 10 oz  Length: 51.4  "cm  Length (in): 20.25"  Head circumference: 13.7 cm  Chest circumference: 13.3 cm  Abdominal girth: 12.5 cm         Delivery Clinician: Delivery Providers    Delivering clinician: Estella Pino MD          Additional  information:  Forceps:    Vacuum:    Breech:    Observed anomalies      Living?:     Apgars    Living status: Living  Apgar Component Scores:  1 min.:  5 min.:  10 min.:  15 min.:  20 min.:    Skin color:  0  1       Heart rate:  1  2       Reflex irritability:  1  1       Muscle tone:  1  2       Respiratory effort:  1  2       Total:  4  8       Apgars assigned by: ROBYN SANTOS RN         Placenta: Delivered:       appearance  Pending Diagnostic Studies:       Procedure Component Value Units Date/Time    CBC auto differential [[id number]]     Order Status: Sent Lab Status: No result     Specimen: Blood     RPR [[id number]] Collected: 24 1649    Order Status: Sent Lab Status: In process Updated: 24    Specimen: Blood             Discharged Condition: good    Disposition: Home or Self Care    Follow Up:   Follow-up Information       Estella Pino MD Follow up in 1 week(s).    Specialty: Obstetrics and Gynecology  Why: For wound re-check  Contact information:  [address] [place] [id number] [phone]                           Patient Instructions:      Pelvic Rest     Notify your health care provider if you experience any of the following:  temperature >100.4     Notify your health care provider if you experience any of the following:  persistent nausea and vomiting or diarrhea     Notify your health care provider if you experience any of the following:  severe uncontrolled pain     Notify your health care provider if you experience any of the following:  redness, tenderness, or signs of infection (pain, swelling, redness, odor or green/yellow discharge around incision site)     Notify your health care provider if you experience any of the following:  " difficulty breathing or increased cough     Notify your health care provider if you experience any of the following:  severe persistent headache     Notify your health care provider if you experience any of the following:  worsening rash     Notify your health care provider if you experience any of the following:  persistent dizziness, light-headedness, or visual disturbances     Notify your health care provider if you experience any of the following:  increased confusion or weakness     Medications:  Current Discharge Medication List        START taking these medications    Details   ibuprofen (ADVIL,MOTRIN) 800 MG tablet Take 1 tablet (800 mg total) by mouth 3 (three) times daily. for 7 days  Qty: 21 tablet, Refills: 0      oxyCODONE (ROXICODONE) 5 MG immediate release tablet Take 1 tablet (5 mg total) by mouth every 6 (six) hours as needed for Pain.  Qty: 16 tablet, Refills: 0    Comments: Quantity prescribed more than 7 day supply? No           CONTINUE these medications which have NOT CHANGED    Details   prenatal 105-iron-folic ac-dha 30 mg iron- 1.4 mg-300 mg Cmpk Take by mouth.           STOP taking these medications       busPIRone (BUSPAR) 5 MG Tab Comments:   Reason for Stopping:               Corinne Carpio MD  Obstetrics  [facility] - Labor & Delivery

## 2024-01-15 NOTE — SUBJECTIVE & OBJECTIVE
Interval History: POD#1    She is doing well this morning. She is tolerating a regular diet without nausea or vomiting. She is voiding spontaneously. She is ambulating. She has passed flatus, and has not a BM. Vaginal bleeding is mild. She denies fever or chills. Abdominal pain is mild and controlled with oral medications. She Is breastfeeding. She desires circumcision for her male baby: no.    Objective:     Vital Signs (Most Recent):  Temp: 96.6 °F (35.9 °C) (01/15/24 0643)  Pulse: 75 (01/15/24 0643)  Resp: 16 (01/15/24 0643)  BP: 117/70 (01/15/24 0643)  SpO2: 99 % (01/15/24 0643) Vital Signs (24h Range):  Temp:  [96.6 °F (35.9 °C)-98.6 °F (37 °C)] 96.6 °F (35.9 °C)  Pulse:  [75-86] 75  Resp:  [16-18] 16  SpO2:  [97 %-99 %] 99 %  BP: (102-119)/(50-74) 117/70     Weight: 93.2 kg (205 lb 5.7 oz)  Body mass index is 37.56 kg/m².      Intake/Output Summary (Last 24 hours) at 1/15/2024 0851  Last data filed at 1/14/2024 1654  Gross per 24 hour   Intake --   Output 425 ml   Net -425 ml         Significant Labs:  Lab Results   Component Value Date    GROUPTRH O POS 01/13/2024    HEPBSAG Non-reactive 05/16/2023    STREPBCULT No Group B Streptococcus isolated 12/13/2023     Recent Labs   Lab 01/13/24  1649   HGB 13.4   HCT 38.2       I have personallly reviewed all pertinent lab results from the last 24 hours.  Recent Lab Results       None            Physical Exam:   Constitutional: She is oriented to person, place, and time. She appears well-developed and well-nourished. No distress.    HENT:   Head: Normocephalic and atraumatic.    Eyes: Pupils are equal, round, and reactive to light. EOM are normal.     Cardiovascular:  Normal rate.             Pulmonary/Chest: Effort normal. No respiratory distress.        Abdominal: Soft. She exhibits abdominal incision (bandage in place). There is no abdominal tenderness.                 Neurological: She is alert and oriented to person, place, and time.     Psychiatric: She has a  normal mood and affect. Her behavior is normal. Judgment and thought content normal.       Review of Systems   Constitutional:  Negative for appetite change, chills and fever.   Eyes:  Negative for visual disturbance.   Respiratory:  Negative for shortness of breath.    Cardiovascular:  Negative for chest pain.   Gastrointestinal:  Positive for abdominal pain. Negative for constipation, diarrhea, nausea and vomiting.   Genitourinary:  Positive for vaginal bleeding. Negative for dysmenorrhea and dyspareunia.   Musculoskeletal:  Negative for back pain.   Neurological:  Negative for syncope and headaches.   Psychiatric/Behavioral:  Negative for depression. The patient is not nervous/anxious.

## 2024-01-15 NOTE — PLAN OF CARE
Pt stable. Vital signs WNL. Tolerating regular diet well. Voiding spontaneously and without difficulty. Fundus firm without massage, 2 cm below umbilicus, midline, light bleeding. Pain adequately controlled with po Ibuprofen and Tylenol. S/O at bedside, supportive of and attentive to pt and infant, bonding well with pt and infant. Pt bonding well with infant and responding to infant cues.    Problem: Adult Inpatient Plan of Care  Goal: Plan of Care Review  1/15/2024 0639 by Alex Perez RN  Outcome: Ongoing, Progressing  2024 by Alex Perez RN  Outcome: Ongoing, Progressing  Goal: Patient-Specific Goal (Individualized)  1/15/2024 0639 by Alex Perez RN  Outcome: Ongoing, Progressing  2024 by Alex Perez RN  Outcome: Ongoing, Progressing  Goal: Absence of Hospital-Acquired Illness or Injury  1/15/2024 0639 by Alex Perez RN  Outcome: Ongoing, Progressing  2024 by Alex Perez RN  Outcome: Ongoing, Progressing  Goal: Optimal Comfort and Wellbeing  1/15/2024 0639 by Alex Perez RN  Outcome: Ongoing, Progressing  2024 by Alex Perez RN  Outcome: Ongoing, Progressing  Goal: Readiness for Transition of Care  1/15/2024 0639 by Alex Perez RN  Outcome: Ongoing, Progressing  2024 by Alex Perez RN  Outcome: Ongoing, Progressing     Problem:  Fall Injury Risk  Goal: Absence of Fall, Infant Drop and Related Injury  1/15/2024 0639 by Alex Perez RN  Outcome: Ongoing, Progressing  2024 by Alex Perez RN  Outcome: Ongoing, Progressing     Problem: Infection  Goal: Absence of Infection Signs and Symptoms  1/15/2024 0639 by Alex Perez RN  Outcome: Ongoing, Progressing  2024 by Alex Perez RN  Outcome: Ongoing, Progressing     Problem: Adjustment to Role Transition (Postpartum  Delivery)  Goal: Successful  Maternal Role Transition  1/15/2024 0639 by Alex Perez RN  Outcome: Ongoing, Progressing  2024 by Alex Perez RN  Outcome: Ongoing, Progressing     Problem: Bleeding (Postpartum  Delivery)  Goal: Hemostasis  1/15/2024 0639 by Alex Perez RN  Outcome: Ongoing, Progressing  2024 by Alex Perez RN  Outcome: Ongoing, Progressing     Problem: Infection (Postpartum  Delivery)  Goal: Absence of Infection Signs and Symptoms  1/15/2024 0639 by Alex Perez RN  Outcome: Ongoing, Progressing  2024 by Alex Perez RN  Outcome: Ongoing, Progressing     Problem: Pain (Postpartum  Delivery)  Goal: Acceptable Pain Control  1/15/2024 06 by Alex Perez RN  Outcome: Ongoing, Progressing  2024 by Alex Perez RN  Outcome: Ongoing, Progressing     Problem: Postoperative Nausea and Vomiting (Postpartum  Delivery)  Goal: Nausea and Vomiting Relief  1/15/2024 06 by Alex Perez RN  Outcome: Ongoing, Progressing  2024 by Alex Perez RN  Outcome: Ongoing, Progressing     Problem: Postoperative Urinary Retention (Postpartum  Delivery)  Goal: Effective Urinary Elimination  1/15/2024 0639 by Alex Perez RN  Outcome: Ongoing, Progressing  2024 by Alex Perez RN  Outcome: Ongoing, Progressing     Problem: Breastfeeding  Goal: Effective Breastfeeding  1/15/2024 0639 by Alex Perez RN  Outcome: Ongoing, Progressing  2024 by Alex Perez RN  Outcome: Ongoing, Progressing

## 2024-01-15 NOTE — PLAN OF CARE
Patient visualized awake and alert breastfeeding infant sitting in a chair. No distress noted. Denies any pain at this time. V/S stable. POC discussed. Verbalizes understanding. Denies any needs.     Problem: Adult Inpatient Plan of Care  Goal: Plan of Care Review  2024 by Alex Perez RN  Outcome: Ongoing, Progressing  2024 0750 by Alex Perez RN  Outcome: Ongoing, Progressing  Goal: Patient-Specific Goal (Individualized)  2024 by Alex Perez RN  Outcome: Ongoing, Progressing  2024 0750 by Alex Perez RN  Outcome: Ongoing, Progressing  Goal: Absence of Hospital-Acquired Illness or Injury  2024 by Alex Perez RN  Outcome: Ongoing, Progressing  2024 0750 by Alex Perez RN  Outcome: Ongoing, Progressing  Goal: Optimal Comfort and Wellbeing  2024 by Alex Perez RN  Outcome: Ongoing, Progressing  2024 0750 by Alex Perez RN  Outcome: Ongoing, Progressing  Goal: Readiness for Transition of Care  2024 by Alex Perez RN  Outcome: Ongoing, Progressing  2024 0750 by Alex Perez RN  Outcome: Ongoing, Progressing     Problem:  Fall Injury Risk  Goal: Absence of Fall, Infant Drop and Related Injury  2024 by Alex Perez RN  Outcome: Ongoing, Progressing  2024 0750 by Alex Perez RN  Outcome: Ongoing, Progressing     Problem: Infection  Goal: Absence of Infection Signs and Symptoms  2024 by Alex Perez RN  Outcome: Ongoing, Progressing  2024 0750 by Alex Perez RN  Outcome: Ongoing, Progressing     Problem: Adjustment to Role Transition (Postpartum  Delivery)  Goal: Successful Maternal Role Transition  Outcome: Ongoing, Progressing     Problem: Bleeding (Postpartum  Delivery)  Goal: Hemostasis  Outcome: Ongoing, Progressing     Problem: Infection (Postpartum   Delivery)  Goal: Absence of Infection Signs and Symptoms  Outcome: Ongoing, Progressing     Problem: Pain (Postpartum  Delivery)  Goal: Acceptable Pain Control  Outcome: Ongoing, Progressing     Problem: Postoperative Nausea and Vomiting (Postpartum  Delivery)  Goal: Nausea and Vomiting Relief  Outcome: Ongoing, Progressing     Problem: Postoperative Urinary Retention (Postpartum  Delivery)  Goal: Effective Urinary Elimination  Outcome: Ongoing, Progressing     Problem: Breastfeeding  Goal: Effective Breastfeeding  Outcome: Ongoing, Progressing

## 2024-01-15 NOTE — LACTATION NOTE
This note was copied from a baby's chart.  1 days male infant born via primary c/s for failure to descend and fetal intolerance. Mother breastfeeding independently, see flowsheet for LATCH score. Adequate intake and output, see flowsheet for details. Parents educated about adequate intake and output, utilizing breastfeeding log.

## 2024-01-16 LAB — RPR SER QL: NORMAL

## 2024-01-17 ENCOUNTER — DOCUMENTATION ONLY (OUTPATIENT)
Dept: REHABILITATION | Facility: HOSPITAL | Age: 29
End: 2024-01-17
Payer: COMMERCIAL

## 2024-01-17 NOTE — PROGRESS NOTES
Pelvic Health Physical Therapy   Discharge Summary     Date: 10/27/2023   Name: Xuan Quintanilla  Clinic Number: [id number]     Therapy Diagnosis:        Encounter Diagnoses   Name Primary?    Pelvic pain in pregnancy      Muscle spasm Yes    Coordination abnormal        Physician: Estella Pino,*     Physician Orders: PT Eval and Treat PF PT  Medical Diagnosis from Referral: O26.899,R10.2 (ICD-10-CM) - Pelvic pain in pregnancy   Evaluation Date: 10/27/2023  Authorization Period Expiration: 10/24/24  Plan of Care Expiration: 1/19/2024  Progress Note Due: 11/24/2023  Visit # 1/12 Visits authorized: 1/ 1   FOTO: 1/3         Assessment     Patient did not return for treatment.  Goals not re-assessed   Short Term Goals: 4 weeks   Patient will report stool consistency of 3-5 on the [facility] Stool chart.  Patient will be independent with pelvic floor relaxation to improve bowel evacuation.   Patient will be able to demonstrate improved pelvic floor relaxation and contraction on sEMG.   Patient will deny coccyx pain.     Long Term Goals: 8 weeks   Patient will be independent with progressive core awareness and stability.  Patient will deny leakage of urine.  Patient will be independent with core and pelvic floor contraction and relaxation.  Patient will demonstrate adequate pelvic floor coordination with contraction and relaxation on sEMG vs rehabilitative ultrasound to allow for improved bowel habits.      Plan     Discharge physical therapy at this time.     Ruy Linares, PT

## 2024-01-19 ENCOUNTER — POSTPARTUM VISIT (OUTPATIENT)
Dept: OBSTETRICS AND GYNECOLOGY | Facility: CLINIC | Age: 29
End: 2024-01-19
Payer: COMMERCIAL

## 2024-01-19 VITALS
WEIGHT: 200.38 LBS | RESPIRATION RATE: 14 BRPM | HEIGHT: 62 IN | HEART RATE: 78 BPM | OXYGEN SATURATION: 98 % | SYSTOLIC BLOOD PRESSURE: 118 MMHG | BODY MASS INDEX: 36.87 KG/M2 | DIASTOLIC BLOOD PRESSURE: 80 MMHG

## 2024-01-19 DIAGNOSIS — Z51.89 VISIT FOR WOUND CHECK: ICD-10-CM

## 2024-01-19 DIAGNOSIS — Z98.891 STATUS POST PRIMARY LOW TRANSVERSE CESAREAN SECTION: Primary | ICD-10-CM

## 2024-01-19 DIAGNOSIS — Z13.31 DEPRESSION SCREEN: ICD-10-CM

## 2024-01-19 DIAGNOSIS — Z01.30 BLOOD PRESSURE CHECK: ICD-10-CM

## 2024-01-19 PROCEDURE — 99999 PR PBB SHADOW E&M-EST. PATIENT-LVL III: CPT | Mod: PBBFAC,,, | Performed by: OBSTETRICS & GYNECOLOGY

## 2024-01-19 PROCEDURE — 99024 POSTOP FOLLOW-UP VISIT: CPT | Mod: S$GLB,,, | Performed by: OBSTETRICS & GYNECOLOGY

## 2024-01-19 NOTE — PROGRESS NOTES
CC: Post-partum follow-up    Xuan Quintanilla is a 28 y.o. female  presents for a postpartum visit.  She is status post   primary CD  1 weeks ago.  Her hospitalization was not complicated.  She is breastfeeding.  She desires oral progesterone-only contraceptive for contraception.  She denies postpartum depression; [place]  depression screening reviewed and 7. She and the baby are doing well.  No pain.  No fever.   No bowel / bladder complaints. Pregnancy was complicated by: none    Her last pap was 2023      ROS:  GENERAL: No fever, chills, fatigability.  VULVAR: No pain, no lesions and no itching.  VAGINAL: No relaxation, no itching, no discharge, no abnormal bleeding and no lesions.  ABDOMEN: No abdominal pain. Denies nausea. Denies vomiting. No diarrhea. No constipation  BREAST: Denies pain. No lumps. No discharge.  URINARY: No incontinence, no nocturia, no frequency and no dysuria.  CARDIOVASCULAR: No chest pain. No shortness of breath. No leg cramps.  NEUROLOGICAL: No headaches. No vision changes.    Past Medical History:   Diagnosis Date    ADHD (attention deficit hyperactivity disorder), combined type 2018    [address] 81    Adolescent idiopathic scoliosis of thoracolumbar region     Agoraphobia with panic attacks     Anxiety hyperventilation     Chronic seasonal allergic rhinitis     COVID-19 10/24/2022    Hx of infectious mononucleosis     Obsessive compulsive disorder     PCR DNA positive for HSV1      Past Surgical History:   Procedure Laterality Date     SECTION N/A 2024    Procedure: PRIMARY  SECTION;  Surgeon: Estella Pino MD;  Location: [facility];  Service: OB/GYN;  Laterality: N/A;     Review of patient's allergies indicates:  No Known Allergies    Current Outpatient Medications:     ibuprofen (ADVIL,MOTRIN) 800 MG tablet, Take 1 tablet (800 mg total) by mouth 3 (three) times daily. for 7 days, Disp: 21 tablet, Rfl: 0    oxyCODONE  (ROXICODONE) 5 MG immediate release tablet, Take 1 tablet (5 mg total) by mouth every 6 (six) hours as needed for Pain., Disp: 16 tablet, Rfl: 0    prenatal 105-iron-folic ac-dha 30 mg iron- 1.4 mg-300 mg Cmpk, Take by mouth., Disp: , Rfl:       Vitals:    24 1155   BP: 118/80   Pulse: 78   Resp: 14         PE: abdomen soft and NT  Incision: aquacel dressing removed; c/d/i    Assessment:    1. Status post primary low transverse  section        2. Visit for wound check        3. Depression screen        4. Blood pressure check                Plan:    1. RTC in 5 weeks  2. Instructions / precautions reviewed  3. Contraceptive counseling; Rx of Micronor

## 2024-02-28 ENCOUNTER — POSTPARTUM VISIT (OUTPATIENT)
Dept: OBSTETRICS AND GYNECOLOGY | Facility: CLINIC | Age: 29
End: 2024-02-28
Payer: COMMERCIAL

## 2024-02-28 VITALS
SYSTOLIC BLOOD PRESSURE: 122 MMHG | OXYGEN SATURATION: 98 % | WEIGHT: 177 LBS | HEIGHT: 62 IN | RESPIRATION RATE: 14 BRPM | BODY MASS INDEX: 32.57 KG/M2 | DIASTOLIC BLOOD PRESSURE: 74 MMHG | HEART RATE: 76 BPM

## 2024-02-28 DIAGNOSIS — Z51.89 VISIT FOR WOUND CHECK: ICD-10-CM

## 2024-02-28 DIAGNOSIS — Z01.30 BLOOD PRESSURE CHECK: ICD-10-CM

## 2024-02-28 DIAGNOSIS — Z30.09 ENCOUNTER FOR OTHER GENERAL COUNSELING OR ADVICE ON CONTRACEPTION: ICD-10-CM

## 2024-02-28 DIAGNOSIS — Z13.31 DEPRESSION SCREEN: ICD-10-CM

## 2024-02-28 DIAGNOSIS — Z98.891 STATUS POST PRIMARY LOW TRANSVERSE CESAREAN SECTION: Primary | ICD-10-CM

## 2024-02-28 PROCEDURE — 99024 POSTOP FOLLOW-UP VISIT: CPT | Mod: S$GLB,,, | Performed by: OBSTETRICS & GYNECOLOGY

## 2024-02-28 PROCEDURE — 99999 PR PBB SHADOW E&M-EST. PATIENT-LVL III: CPT | Mod: PBBFAC,,, | Performed by: OBSTETRICS & GYNECOLOGY

## 2024-02-28 NOTE — PROGRESS NOTES
CC: Post-partum follow-up    Xuan Quintanilla is a 29 y.o. female  presents for a postpartum visit.  She is status post   primary CD  6 weeks ago.  Her hospitalization was not complicated.  She is breastfeeding.  She desires NFP for contraception.  She denies postpartum depression; [place]  depression screening reviewed and 8. She and the baby are doing well.  No pain.  No fever.   No bowel / bladder complaints. Pregnancy was complicated by: none    Her last pap was 2023      ROS:  GENERAL: No fever, chills, fatigability.  VULVAR: No pain, no lesions and no itching.  VAGINAL: No relaxation, no itching, no discharge, no abnormal bleeding and no lesions.  ABDOMEN: No abdominal pain. Denies nausea. Denies vomiting. No diarrhea. No constipation  BREAST: Denies pain. No lumps. No discharge.  URINARY: No incontinence, no nocturia, no frequency and no dysuria.  CARDIOVASCULAR: No chest pain. No shortness of breath. No leg cramps.  NEUROLOGICAL: No headaches. No vision changes.    Past Medical History:   Diagnosis Date    ADHD (attention deficit hyperactivity disorder), combined type 2018    Jasper Goldberg 81    Adolescent idiopathic scoliosis of thoracolumbar region     Agoraphobia with panic attacks     Anxiety hyperventilation     Chronic seasonal allergic rhinitis     COVID-19 10/24/2022    Hx of infectious mononucleosis     Obsessive compulsive disorder     PCR DNA positive for HSV1      Past Surgical History:   Procedure Laterality Date     SECTION N/A 2024    Procedure: PRIMARY  SECTION;  Surgeon: Estella Pino MD;  Location: [facility];  Service: OB/GYN;  Laterality: N/A;     Review of patient's allergies indicates:  No Known Allergies    Current Outpatient Medications:     prenatal 105-iron-folic ac-dha 30 mg iron- 1.4 mg-300 mg Cmpk, Take by mouth., Disp: , Rfl:       Vitals:    24 1540   BP: 122/74   Pulse: 76   Resp: 14         PE: abdomen soft and  NT  Incision: well healed  Pelvic exam: VULVA: normal appearing vulva with no masses, tenderness or lesions, VAGINA: normal appearing vagina with normal color and discharge, no lesions, CERVIX: normal appearing cervix without discharge or lesions, UTERUS: uterus is normal size, shape, consistency and nontender, ADNEXA: normal adnexa in size, nontender and no masses.      Assessment:    1. Status post primary low transverse  section        2. Depression screen        3. Blood pressure check        4. Visit for wound check        5. Encounter for other general counseling or advice on contraception                Plan:    1. Release  2. Instructions / precautions reviewed  3. Contraceptive counseling; NFP  4. RTC for annual exam/pap smear in March

## 2024-03-19 ENCOUNTER — OFFICE VISIT (OUTPATIENT)
Dept: OBSTETRICS AND GYNECOLOGY | Facility: CLINIC | Age: 29
End: 2024-03-19
Payer: COMMERCIAL

## 2024-03-19 VITALS
BODY MASS INDEX: 32.35 KG/M2 | OXYGEN SATURATION: 95 % | DIASTOLIC BLOOD PRESSURE: 72 MMHG | HEART RATE: 89 BPM | HEIGHT: 62 IN | SYSTOLIC BLOOD PRESSURE: 118 MMHG | WEIGHT: 175.81 LBS | RESPIRATION RATE: 16 BRPM

## 2024-03-19 DIAGNOSIS — Z11.51 SCREENING FOR HPV (HUMAN PAPILLOMAVIRUS): ICD-10-CM

## 2024-03-19 DIAGNOSIS — Z01.419 WELL WOMAN EXAM WITH ROUTINE GYNECOLOGICAL EXAM: Primary | ICD-10-CM

## 2024-03-19 DIAGNOSIS — Z12.39 ENCOUNTER FOR BREAST CANCER SCREENING USING NON-MAMMOGRAM MODALITY: ICD-10-CM

## 2024-03-19 DIAGNOSIS — Z12.4 SCREENING FOR MALIGNANT NEOPLASM OF CERVIX: ICD-10-CM

## 2024-03-19 PROCEDURE — 87624 HPV HI-RISK TYP POOLED RSLT: CPT | Performed by: OBSTETRICS & GYNECOLOGY

## 2024-03-19 PROCEDURE — 99999 PR PBB SHADOW E&M-EST. PATIENT-LVL III: CPT | Mod: PBBFAC,,, | Performed by: OBSTETRICS & GYNECOLOGY

## 2024-03-19 PROCEDURE — 3078F DIAST BP <80 MM HG: CPT | Mod: CPTII,S$GLB,, | Performed by: OBSTETRICS & GYNECOLOGY

## 2024-03-19 PROCEDURE — 3074F SYST BP LT 130 MM HG: CPT | Mod: CPTII,S$GLB,, | Performed by: OBSTETRICS & GYNECOLOGY

## 2024-03-19 PROCEDURE — 88175 CYTOPATH C/V AUTO FLUID REDO: CPT | Performed by: OBSTETRICS & GYNECOLOGY

## 2024-03-19 PROCEDURE — 99395 PREV VISIT EST AGE 18-39: CPT | Mod: S$GLB,,, | Performed by: OBSTETRICS & GYNECOLOGY

## 2024-03-19 PROCEDURE — 87625 HPV TYPES 16 & 18 ONLY: CPT | Performed by: OBSTETRICS & GYNECOLOGY

## 2024-03-19 PROCEDURE — 3008F BODY MASS INDEX DOCD: CPT | Mod: CPTII,S$GLB,, | Performed by: OBSTETRICS & GYNECOLOGY

## 2024-03-19 PROCEDURE — 1159F MED LIST DOCD IN RCRD: CPT | Mod: CPTII,S$GLB,, | Performed by: OBSTETRICS & GYNECOLOGY

## 2024-03-19 NOTE — PROGRESS NOTES
Subjective:    Patient ID: Xuan Quintanilla is a 29 y.o. y.o. female.     Chief Complaint: Annual Well Woman Exam     History of Present Illness:  Xuan presents today for Annual Well Woman exam. She describes her menses as  absent with breastfeeding .She denies pelvic pain.  She denies breast tenderness, masses, nipple discharge. She denies difficulty with urination or bowel movements. She is sexually active. Contraception is by no method.    The following portions of the patient's history were reviewed and updated as appropriate: allergies, current medications, past family history, past medical history, past social history, past surgical history and problem list.      Menstrual History:   Patient's last menstrual period was 04/10/2023 (exact date)..     OB History          1    Para   1    Term   1            AB        Living   1         SAB        IAB        Ectopic        Multiple   0    Live Births   1                 The following portions of the patient's history were reviewed and updated as appropriate: allergies, current medications, past family history, past medical history, past social history, past surgical history, and problem list.        ROS:     Review of Systems   Constitutional:  Negative for activity change, appetite change, chills, diaphoresis, fatigue, fever and unexpected weight change.   HENT:  Negative for mouth sores and tinnitus.    Eyes:  Negative for discharge and visual disturbance.   Respiratory:  Negative for cough, shortness of breath and wheezing.    Cardiovascular:  Negative for chest pain, palpitations and leg swelling.   Gastrointestinal:  Negative for abdominal pain, bloating, blood in stool, constipation, diarrhea, nausea and vomiting.   Endocrine: Negative for diabetes, hair loss, hot flashes, hyperthyroidism and hypothyroidism.   Genitourinary:  Negative for dysuria, flank pain, frequency, genital sores, hematuria, urgency, vaginal bleeding, vaginal  "discharge, vaginal pain, postcoital bleeding and vaginal odor.   Musculoskeletal:  Negative for back pain, joint swelling and myalgias.   Integumentary:  Negative for rash, acne, breast mass, nipple discharge and breast skin changes.   Neurological:  Negative for seizures, syncope, numbness and headaches.   Hematological:  Negative for adenopathy. Does not bruise/bleed easily.   Psychiatric/Behavioral:  Negative for depression and sleep disturbance. The patient is not nervous/anxious.    Breast: Negative for mass, mastodynia, nipple discharge and skin changes      Objective:    Vital Signs:  Vitals:    03/19/24 1508   BP: 118/72   BP Location: Left arm   Patient Position: Sitting   BP Method: Medium (Manual)   Pulse: 89   Resp: 16   SpO2: 95%   Weight: 79.8 kg (175 lb 13.1 oz)   Height: 5' 2" (1.575 m)         Physical Exam:  General:  alert, cooperative, appears stated age   Skin:  Skin color, texture, turgor normal. No rashes or lesions   HEENT:  conjunctivae/corneas clear. PERRL.   Neck: supple, trachea midline, no adenopathy or thyromegally   Respiratory:  clear to auscultation bilaterally   Heart:  regular rate and rhythm, S1, S2 normal, no murmur, click, rub or gallop   Breasts:  no discharge, erythema, or tenderness   Abdomen:  normal findings: bowel sounds normal, no masses palpable, no organomegaly and soft, non-tender   Pelvis: External genitalia: normal general appearance  Urinary system: urethral meatus normal, bladder nontender  Vaginal: normal mucosa without prolapse or lesions  Cervix: normal appearance  Uterus: normal size, shape, position  Adnexa: normal size, nontender bilaterally   Extremities: Normal ROM; no edema, no cyanosis   Neurologial: Normal strength and tone. No focal numbness or weakness. Reflexes 2+ and equal.   Psychiatric: normal mood, speech, dress, and thought processes         Assessment:       Healthy female exam.     1. Well woman exam with routine gynecological exam    2. " Screening for HPV (human papillomavirus)    3. Screening for malignant neoplasm of cervix    4. Encounter for breast cancer screening using non-mammogram modality          Plan:       Thin prep Pap smear.  RTC in 1 year or prn     COUNSELING:  Xuan was counseled on STD pevention, use and side-effects of various contraceptive measures, A.C.O.G. Pap guidelines and recommendations for yearly pelvic exams in addition to recommendations for monthly self breast exams; to see her PCP for other health maintenance.

## 2024-03-26 LAB
CLINICAL INFO: ABNORMAL
CYTO CVX: ABNORMAL
CYTOLOGIST CVX/VAG CYTO: ABNORMAL
CYTOLOGIST CVX/VAG CYTO: ABNORMAL
CYTOLOGY CMNT CVX/VAG CYTO-IMP: ABNORMAL
CYTOLOGY PAP THIN PREP EXPLANATION: ABNORMAL
DATE OF PREVIOUS PAP: ABNORMAL
DATE PREVIOUS BX: NO
GEN CATEG CVX/VAG CYTO-IMP: ABNORMAL
HPV I/H RISK 4 DNA CVX QL NAA+PROBE: DETECTED
HPV16 DNA CVX QL PROBE+SIG AMP: NOT DETECTED
HPV18 DNA CVX QL PROBE+SIG AMP: NOT DETECTED
LMP START DATE: ABNORMAL
MICROORGANISM CVX/VAG CYTO: ABNORMAL
PATHOLOGIST CVX/VAG CYTO: ABNORMAL
SERVICE CMNT-IMP: ABNORMAL
SPECIMEN SOURCE CVX/VAG CYTO: ABNORMAL
STAT OF ADQ CVX/VAG CYTO-IMP: ABNORMAL

## 2024-04-15 PROBLEM — Z37.9 NORMAL LABOR: Status: RESOLVED | Noted: 2024-01-14 | Resolved: 2024-04-15

## 2024-04-18 ENCOUNTER — PROCEDURE VISIT (OUTPATIENT)
Dept: OBSTETRICS AND GYNECOLOGY | Facility: CLINIC | Age: 29
End: 2024-04-18
Payer: COMMERCIAL

## 2024-04-18 VITALS
WEIGHT: 175.63 LBS | OXYGEN SATURATION: 100 % | HEART RATE: 69 BPM | RESPIRATION RATE: 12 BRPM | HEIGHT: 62 IN | DIASTOLIC BLOOD PRESSURE: 82 MMHG | BODY MASS INDEX: 32.32 KG/M2 | SYSTOLIC BLOOD PRESSURE: 124 MMHG

## 2024-04-18 DIAGNOSIS — Z01.812 PRE-PROCEDURE LAB EXAM: ICD-10-CM

## 2024-04-18 DIAGNOSIS — R87.618 PAP SMEAR ABNORMALITY OF CERVIX/HUMAN PAPILLOMAVIRUS (HPV) POSITIVE: ICD-10-CM

## 2024-04-18 DIAGNOSIS — R87.612 LGSIL ON PAP SMEAR OF CERVIX: Primary | ICD-10-CM

## 2024-04-18 PROBLEM — Z98.891 STATUS POST PRIMARY LOW TRANSVERSE CESAREAN SECTION: Status: RESOLVED | Noted: 2024-01-15 | Resolved: 2024-04-18

## 2024-04-18 PROBLEM — O41.1230 CHORIOAMNIONITIS IN THIRD TRIMESTER: Status: RESOLVED | Noted: 2024-01-14 | Resolved: 2024-04-18

## 2024-04-18 LAB
B-HCG UR QL: NEGATIVE
CTP QC/QA: YES

## 2024-04-18 PROCEDURE — 88305 TISSUE EXAM BY PATHOLOGIST: CPT | Mod: 59 | Performed by: PATHOLOGY

## 2024-04-18 PROCEDURE — 81025 URINE PREGNANCY TEST: CPT | Mod: S$GLB,,, | Performed by: OBSTETRICS & GYNECOLOGY

## 2024-04-18 PROCEDURE — 88305 TISSUE EXAM BY PATHOLOGIST: CPT | Mod: 26,,, | Performed by: PATHOLOGY

## 2024-04-18 PROCEDURE — 57454 BX/CURETT OF CERVIX W/SCOPE: CPT | Mod: S$GLB,,, | Performed by: OBSTETRICS & GYNECOLOGY

## 2024-04-18 NOTE — PROCEDURES
Colposcopy    Date/Time: 4/18/2024 10:00 AM    Performed by: Estella Pino MD  Authorized by: Estella Pino MD      Colposcopy Site:  Cervix  Acrowhite Lesion? Yes    Atypical Vessels: No    Transformation Zone Adequate?: Yes    Biopsy?: Yes         Location:  Cervix ((3 00))  ECC Performed?: Yes    LEEP Performed?: No     Patient tolerated the procedure well with no immediate complications.

## 2024-04-22 ENCOUNTER — OFFICE VISIT (OUTPATIENT)
Dept: INTERNAL MEDICINE | Facility: CLINIC | Age: 29
End: 2024-04-22
Payer: COMMERCIAL

## 2024-04-22 VITALS
SYSTOLIC BLOOD PRESSURE: 110 MMHG | HEART RATE: 66 BPM | WEIGHT: 174.19 LBS | HEIGHT: 62 IN | BODY MASS INDEX: 32.05 KG/M2 | OXYGEN SATURATION: 99 % | RESPIRATION RATE: 20 BRPM | DIASTOLIC BLOOD PRESSURE: 68 MMHG

## 2024-04-22 DIAGNOSIS — G58.9 COMPRESSION NEUROPATHY: ICD-10-CM

## 2024-04-22 DIAGNOSIS — F90.2 ADHD (ATTENTION DEFICIT HYPERACTIVITY DISORDER), COMBINED TYPE: Primary | ICD-10-CM

## 2024-04-22 DIAGNOSIS — F42.9 OBSESSIVE-COMPULSIVE DISORDER, UNSPECIFIED TYPE: ICD-10-CM

## 2024-04-22 PROCEDURE — 3008F BODY MASS INDEX DOCD: CPT | Mod: CPTII,S$GLB,, | Performed by: INTERNAL MEDICINE

## 2024-04-22 PROCEDURE — 99999 PR PBB SHADOW E&M-EST. PATIENT-LVL III: CPT | Mod: PBBFAC,,, | Performed by: INTERNAL MEDICINE

## 2024-04-22 PROCEDURE — 3078F DIAST BP <80 MM HG: CPT | Mod: CPTII,S$GLB,, | Performed by: INTERNAL MEDICINE

## 2024-04-22 PROCEDURE — 1159F MED LIST DOCD IN RCRD: CPT | Mod: CPTII,S$GLB,, | Performed by: INTERNAL MEDICINE

## 2024-04-22 PROCEDURE — 3074F SYST BP LT 130 MM HG: CPT | Mod: CPTII,S$GLB,, | Performed by: INTERNAL MEDICINE

## 2024-04-22 PROCEDURE — 1160F RVW MEDS BY RX/DR IN RCRD: CPT | Mod: CPTII,S$GLB,, | Performed by: INTERNAL MEDICINE

## 2024-04-22 PROCEDURE — 99214 OFFICE O/P EST MOD 30 MIN: CPT | Mod: S$GLB,,, | Performed by: INTERNAL MEDICINE

## 2024-04-22 NOTE — PROGRESS NOTES
Subjective:       Patient ID: Xuan Quintanilla is a 29 y.o. female.    Chief Complaint: Follow-up      HPI:  Patient is nown to me and presents for follow up ADHD and anxiety/OCD.      ADHD and anxiety: she was on Zoloft 25mg and Adderall 10mg when established last visit. Overall was working well. Was taking adderall daily and sometimes could skip her dose.  Since she got pregnant and stopped both medications.  She tried buspar at the end of her pregnancy but this was not helpful for her symptoms so stpped taking.     For the most part she reports she is doing well. She is getting through the work day without panic attacks. She Resumed her Zoloft 25mg 5 days ago. She is breast feeding. She gets stomach pain with initiating th medication and this has always happened when starting so was expecting it but her baby has started with GI side effects--constipation and very fussy. Wondering if due to SSRI in breast mild      She has right arm numbness and tingling. Started right shoulder blade and shooting down the arm into the wrist. This was very severe towards the end of her pregnancy. Resolved for the first few weeks post partum but has returned again. She is right handed. Hold baby on right side. Got worse when returned to work.       Tobacco use: denies  EtOH use: no daily use  Illicit drug use: denies    Past Medical History:   Diagnosis Date    ADHD (attention deficit hyperactivity disorder), combined type 03/2018    Jasper Goldberg 81    Adolescent idiopathic scoliosis of thoracolumbar region     Agoraphobia with panic attacks     Anxiety hyperventilation     Chronic seasonal allergic rhinitis     COVID-19 10/24/2022    Hx of infectious mononucleosis     Obsessive compulsive disorder     PCR DNA positive for HSV1        Family History   Problem Relation Name Age of Onset    Hypertension Mother      Miscarriages / Stillbirths Mother      Obesity Mother      Anxiety disorder Mother      Metabolic syndrome Mother       ADD / ADHD Mother      Polycystic ovary syndrome Mother      Endometriosis Mother      Blindness Father          right eye    Heart attack Maternal Uncle      Stroke Maternal Uncle      Heart disease Maternal Grandmother      Arthritis Maternal Grandmother      Cancer Maternal Grandfather      Heart attack Maternal Grandfather      Breast cancer Neg Hx      Colon cancer Neg Hx      Diabetes Neg Hx         Social History     Socioeconomic History    Marital status:      Spouse name: Alf Keller    Number of children: 0    Highest education level: Some college, no degree   Occupational History    Occupation: Student at [employer]      Comment: working as Tech in ER    Occupation: Former manager AE 5+ years   Tobacco Use    Smoking status: Never    Smokeless tobacco: Never   Substance and Sexual Activity    Alcohol use: Not Currently     Comment: once every 2 years    Drug use: Never    Sexual activity: Yes     Partners: Male     Birth control/protection: None       Review of Systems   Constitutional:  Negative for activity change, fatigue, fever and unexpected weight change.   HENT:  Negative for congestion, ear pain, hearing loss, rhinorrhea and sore throat.    Eyes:  Negative for redness and visual disturbance.   Respiratory:  Negative for cough, shortness of breath and wheezing.    Cardiovascular:  Negative for chest pain, palpitations and leg swelling.   Gastrointestinal:  Negative for abdominal pain, constipation, diarrhea, nausea and vomiting.        Reflux epigastric burning     Genitourinary:  Negative for dysuria, frequency and urgency.   Musculoskeletal:  Negative for back pain, joint swelling and neck pain.   Skin:  Negative for color change, rash and wound.   Neurological:  Positive for numbness. Negative for dizziness, tremors, weakness, light-headedness and headaches.         Objective:      Physical Exam  Vitals reviewed.   Constitutional:       General: She is not in acute distress.      Appearance: She is well-developed.   HENT:      Head: Normocephalic and atraumatic.      Right Ear: External ear normal.      Left Ear: External ear normal.      Nose: Nose normal.   Eyes:      General:         Right eye: No discharge.         Left eye: No discharge.      Conjunctiva/sclera: Conjunctivae normal.   Neck:      Thyroid: No thyromegaly.   Cardiovascular:      Rate and Rhythm: Normal rate and regular rhythm.   Pulmonary:      Effort: Pulmonary effort is normal. No respiratory distress.   Skin:     General: Skin is warm and dry.   Neurological:      Mental Status: She is alert and oriented to person, place, and time.   Psychiatric:         Behavior: Behavior normal.         Thought Content: Thought content normal.         Assessment:       1. ADHD (attention deficit hyperactivity disorder), combined type    2. Obsessive-compulsive disorder, unspecified type    3. Compression neuropathy        Plan:       1. ADHD (attention deficit hyperactivity disorder), combined type  Chronic controlled  Off stimulant  Follow clinically    2. Obsessive-compulsive disorder, unspecified type  Chronic stable  She had resumed SSRI but breastfeeding and concerned causing issues in new born with GI Side effects  OK to stop SSRI and monitor baby. If baby sx resolve she will stay off SSRI for now. If baby's sx continue then she will resume  Will message me in a few weeks      3. Compression neuropathy  New problem  Likely post partum with using right arm more holding baby, etc  Discussed PT--she will wait for now. Let me know if worsening  PRN Tyelnol, NSAIDs       RTC 6 months and PRN pending resuming SSRI

## 2024-04-26 LAB
COMMENT: NORMAL
FINAL PATHOLOGIC DIAGNOSIS: NORMAL
GROSS: NORMAL
Lab: NORMAL

## 2024-09-09 ENCOUNTER — PATIENT MESSAGE (OUTPATIENT)
Dept: INTERNAL MEDICINE | Facility: CLINIC | Age: 29
End: 2024-09-09
Payer: COMMERCIAL

## 2024-09-09 ENCOUNTER — TELEPHONE (OUTPATIENT)
Dept: INTERNAL MEDICINE | Facility: CLINIC | Age: 29
End: 2024-09-09
Payer: COMMERCIAL

## 2024-09-09 RX ORDER — SCOLOPAMINE TRANSDERMAL SYSTEM 1 MG/1
1 PATCH, EXTENDED RELEASE TRANSDERMAL
Qty: 4 PATCH | Refills: 0 | Status: SHIPPED | OUTPATIENT
Start: 2024-09-09

## 2024-09-09 NOTE — TELEPHONE ENCOUNTER
Patient is currently on vacation and stated that they are doing theme tolbert and she is getting very nauseated. Patient is requesting the patches to put behind her ear for the motion sickness. Patient stated they will be there for 6 more days as well as having a plane ride home.     Pharmacy is:  [facility]  [address] [place] [address]    Added pharmacy on patient preferences for you

## 2024-09-09 NOTE — TELEPHONE ENCOUNTER
"Meclizine is another option. However "it is unknown if excreted in breast milk. Drowsiness and irritability have been seen in infants".     No great options unfortunately.   "

## 2024-09-09 NOTE — TELEPHONE ENCOUNTER
Pt has been notified, she states she is still breastfeeding she is asking is something else that could work the same would be a better option even if is pill form she had been on the patch before so that's why she asked for the patch but she didn't not considered  the breastfeeding, please advise.

## 2024-09-09 NOTE — TELEPHONE ENCOUNTER
Patches sent.  Scopolamine is excreted in breast milk. Unsure if still breastfeeding/pumping but if so needs to be aware. Not firm contraindication but can get into baby's system.

## 2024-10-18 ENCOUNTER — PATIENT MESSAGE (OUTPATIENT)
Dept: INTERNAL MEDICINE | Facility: CLINIC | Age: 29
End: 2024-10-18
Payer: COMMERCIAL

## 2024-10-18 RX ORDER — SCOLOPAMINE TRANSDERMAL SYSTEM 1 MG/1
1 PATCH, EXTENDED RELEASE TRANSDERMAL
Qty: 4 PATCH | Refills: 0 | Status: SHIPPED | OUTPATIENT
Start: 2024-10-18

## 2024-10-18 NOTE — TELEPHONE ENCOUNTER
No care due was identified.  [facility] Embedded Care Due Messages. Reference number: [id number].   10/18/2024 7:55:38 AM CDT

## 2024-10-18 NOTE — TELEPHONE ENCOUNTER
Please see the attached refill request. Please see request:  None [Patient requested: Silv[address] [place] [address]]

## 2024-12-18 ENCOUNTER — PATIENT MESSAGE (OUTPATIENT)
Dept: INTERNAL MEDICINE | Facility: CLINIC | Age: 29
End: 2024-12-18
Payer: COMMERCIAL

## 2024-12-18 RX ORDER — OSELTAMIVIR PHOSPHATE 75 MG/1
75 CAPSULE ORAL 2 TIMES DAILY
Qty: 10 CAPSULE | Refills: 0 | Status: SHIPPED | OUTPATIENT
Start: 2024-12-18 | End: 2024-12-23

## 2025-02-10 ENCOUNTER — HOSPITAL ENCOUNTER (OUTPATIENT)
Dept: RADIOLOGY | Facility: HOSPITAL | Age: 30
Discharge: HOME | End: 2025-02-10
Attending: FAMILY MEDICINE
Payer: COMMERCIAL

## 2025-02-10 ENCOUNTER — ANESTHESIA (OUTPATIENT)
Dept: SURGERY | Facility: HOSPITAL | Age: 30
End: 2025-02-10
Payer: COMMERCIAL

## 2025-02-10 ENCOUNTER — ANESTHESIA EVENT (OUTPATIENT)
Dept: SURGERY | Facility: HOSPITAL | Age: 30
End: 2025-02-10
Payer: COMMERCIAL

## 2025-02-10 ENCOUNTER — HOSPITAL ENCOUNTER (OUTPATIENT)
Facility: HOSPITAL | Age: 30
Discharge: HOME | End: 2025-02-10
Attending: OBSTETRICS & GYNECOLOGY | Admitting: OBSTETRICS & GYNECOLOGY
Payer: COMMERCIAL

## 2025-02-10 DIAGNOSIS — N83.519 OVARIAN TORSION: ICD-10-CM

## 2025-02-10 DIAGNOSIS — R10.31 RIGHT LOWER QUADRANT PAIN: ICD-10-CM

## 2025-02-10 DIAGNOSIS — R10.31 RIGHT LOWER QUADRANT PAIN: Primary | ICD-10-CM

## 2025-02-10 LAB
B-HCG UR QL: NEGATIVE
CTP QC/QA: YES

## 2025-02-10 PROCEDURE — 36000709 HC OR TIME LEV III EA ADD 15 MIN: Performed by: STUDENT IN AN ORGANIZED HEALTH CARE EDUCATION/TRAINING PROGRAM

## 2025-02-10 PROCEDURE — 37000008 HC ANESTHESIA 1ST 15 MINUTES: Performed by: STUDENT IN AN ORGANIZED HEALTH CARE EDUCATION/TRAINING PROGRAM

## 2025-02-10 PROCEDURE — 88305 TISSUE EXAM BY PATHOLOGIST: CPT | Performed by: PATHOLOGY

## 2025-02-10 PROCEDURE — 27201423 OPTIME MED/SURG SUP & DEVICES STERILE SUPPLY: Performed by: STUDENT IN AN ORGANIZED HEALTH CARE EDUCATION/TRAINING PROGRAM

## 2025-02-10 PROCEDURE — D9220AH HC ANESTHESIA PROFESSIONAL FEE: Mod: QZ,P2 | Performed by: NURSE ANESTHETIST, CERTIFIED REGISTERED

## 2025-02-10 PROCEDURE — 37000009 HC ANESTHESIA EA ADD 15 MINS: Performed by: STUDENT IN AN ORGANIZED HEALTH CARE EDUCATION/TRAINING PROGRAM

## 2025-02-10 PROCEDURE — 00840 ANES IPER PX LOWER ABD NOS: CPT | Mod: QZ,P2 | Performed by: NURSE ANESTHETIST, CERTIFIED REGISTERED

## 2025-02-10 PROCEDURE — 63600175 PHARM REV CODE 636 W HCPCS: Performed by: STUDENT IN AN ORGANIZED HEALTH CARE EDUCATION/TRAINING PROGRAM

## 2025-02-10 PROCEDURE — 25000003 PHARM REV CODE 250: Performed by: NURSE ANESTHETIST, CERTIFIED REGISTERED

## 2025-02-10 PROCEDURE — 71000033 HC RECOVERY, INTIAL HOUR: Performed by: STUDENT IN AN ORGANIZED HEALTH CARE EDUCATION/TRAINING PROGRAM

## 2025-02-10 PROCEDURE — 71000039 HC RECOVERY, EACH ADD'L HOUR: Performed by: STUDENT IN AN ORGANIZED HEALTH CARE EDUCATION/TRAINING PROGRAM

## 2025-02-10 PROCEDURE — 76830 TRANSVAGINAL US NON-OB: CPT | Mod: 26,,, | Performed by: RADIOLOGY

## 2025-02-10 PROCEDURE — 36000708 HC OR TIME LEV III 1ST 15 MIN: Performed by: STUDENT IN AN ORGANIZED HEALTH CARE EDUCATION/TRAINING PROGRAM

## 2025-02-10 PROCEDURE — 76830 TRANSVAGINAL US NON-OB: CPT | Mod: TC

## 2025-02-10 PROCEDURE — 63600175 PHARM REV CODE 636 W HCPCS: Performed by: NURSE ANESTHETIST, CERTIFIED REGISTERED

## 2025-02-10 PROCEDURE — 25000003 PHARM REV CODE 250: Performed by: STUDENT IN AN ORGANIZED HEALTH CARE EDUCATION/TRAINING PROGRAM

## 2025-02-10 PROCEDURE — 76856 US EXAM PELVIC COMPLETE: CPT | Mod: 26,,, | Performed by: RADIOLOGY

## 2025-02-10 RX ORDER — IBUPROFEN 800 MG/1
800 TABLET ORAL 3 TIMES DAILY
Qty: 21 TABLET | Refills: 0 | Status: SHIPPED | OUTPATIENT
Start: 2025-02-10 | End: 2025-02-17

## 2025-02-10 RX ORDER — BUPIVACAINE HYDROCHLORIDE 5 MG/ML
INJECTION, SOLUTION EPIDURAL; INTRACAUDAL
Status: DISCONTINUED | OUTPATIENT
Start: 2025-02-10 | End: 2025-02-10 | Stop reason: HOSPADM

## 2025-02-10 RX ORDER — LIDOCAINE HYDROCHLORIDE 20 MG/ML
INJECTION, SOLUTION EPIDURAL; INFILTRATION; INTRACAUDAL; PERINEURAL
Status: DISCONTINUED | OUTPATIENT
Start: 2025-02-10 | End: 2025-02-10

## 2025-02-10 RX ORDER — HYDROMORPHONE HYDROCHLORIDE 1 MG/ML
1 INJECTION, SOLUTION INTRAMUSCULAR; INTRAVENOUS; SUBCUTANEOUS EVERY 6 HOURS PRN
Status: DISCONTINUED | OUTPATIENT
Start: 2025-02-10 | End: 2025-02-10 | Stop reason: HOSPADM

## 2025-02-10 RX ORDER — ONDANSETRON 8 MG/1
8 TABLET, ORALLY DISINTEGRATING ORAL EVERY 8 HOURS PRN
Status: DISCONTINUED | OUTPATIENT
Start: 2025-02-10 | End: 2025-02-10 | Stop reason: HOSPADM

## 2025-02-10 RX ORDER — DIPHENHYDRAMINE HCL 25 MG
25 CAPSULE ORAL EVERY 4 HOURS PRN
Status: DISCONTINUED | OUTPATIENT
Start: 2025-02-10 | End: 2025-02-10 | Stop reason: HOSPADM

## 2025-02-10 RX ORDER — ROCURONIUM BROMIDE 10 MG/ML
INJECTION, SOLUTION INTRAVENOUS
Status: DISCONTINUED | OUTPATIENT
Start: 2025-02-10 | End: 2025-02-10

## 2025-02-10 RX ORDER — FENTANYL CITRATE 50 UG/ML
INJECTION, SOLUTION INTRAMUSCULAR; INTRAVENOUS
Status: DISCONTINUED | OUTPATIENT
Start: 2025-02-10 | End: 2025-02-10

## 2025-02-10 RX ORDER — HYDROCODONE BITARTRATE AND ACETAMINOPHEN 5; 325 MG/1; MG/1
1 TABLET ORAL EVERY 4 HOURS PRN
Status: DISCONTINUED | OUTPATIENT
Start: 2025-02-10 | End: 2025-02-10 | Stop reason: HOSPADM

## 2025-02-10 RX ORDER — OXYCODONE HYDROCHLORIDE 5 MG/1
5 TABLET ORAL EVERY 6 HOURS PRN
Qty: 16 TABLET | Refills: 0 | Status: SHIPPED | OUTPATIENT
Start: 2025-02-10 | End: 2025-02-14

## 2025-02-10 RX ORDER — PROPOFOL 10 MG/ML
VIAL (ML) INTRAVENOUS
Status: DISCONTINUED | OUTPATIENT
Start: 2025-02-10 | End: 2025-02-10

## 2025-02-10 RX ORDER — IBUPROFEN 600 MG/1
600 TABLET ORAL EVERY 6 HOURS PRN
Status: DISCONTINUED | OUTPATIENT
Start: 2025-02-10 | End: 2025-02-10 | Stop reason: HOSPADM

## 2025-02-10 RX ORDER — SUCCINYLCHOLINE CHLORIDE 20 MG/ML
INJECTION INTRAMUSCULAR; INTRAVENOUS
Status: DISCONTINUED | OUTPATIENT
Start: 2025-02-10 | End: 2025-02-10

## 2025-02-10 RX ORDER — KETOROLAC TROMETHAMINE 30 MG/ML
INJECTION, SOLUTION INTRAMUSCULAR; INTRAVENOUS
Status: DISCONTINUED | OUTPATIENT
Start: 2025-02-10 | End: 2025-02-10

## 2025-02-10 RX ORDER — DIPHENHYDRAMINE HYDROCHLORIDE 50 MG/ML
25 INJECTION INTRAMUSCULAR; INTRAVENOUS EVERY 4 HOURS PRN
Status: DISCONTINUED | OUTPATIENT
Start: 2025-02-10 | End: 2025-02-10 | Stop reason: HOSPADM

## 2025-02-10 RX ORDER — ONDANSETRON HYDROCHLORIDE 2 MG/ML
INJECTION, SOLUTION INTRAMUSCULAR; INTRAVENOUS
Status: DISCONTINUED | OUTPATIENT
Start: 2025-02-10 | End: 2025-02-10

## 2025-02-10 RX ORDER — HYDROCODONE BITARTRATE AND ACETAMINOPHEN 7.5; 325 MG/1; MG/1
1 TABLET ORAL EVERY 4 HOURS PRN
Status: DISCONTINUED | OUTPATIENT
Start: 2025-02-10 | End: 2025-02-10 | Stop reason: HOSPADM

## 2025-02-10 RX ORDER — SODIUM CHLORIDE, SODIUM LACTATE, POTASSIUM CHLORIDE, CALCIUM CHLORIDE 600; 310; 30; 20 MG/100ML; MG/100ML; MG/100ML; MG/100ML
INJECTION, SOLUTION INTRAVENOUS CONTINUOUS PRN
Status: DISCONTINUED | OUTPATIENT
Start: 2025-02-10 | End: 2025-02-10

## 2025-02-10 RX ORDER — HYDROMORPHONE HYDROCHLORIDE 2 MG/ML
INJECTION, SOLUTION INTRAMUSCULAR; INTRAVENOUS; SUBCUTANEOUS
Status: DISCONTINUED | OUTPATIENT
Start: 2025-02-10 | End: 2025-02-10

## 2025-02-10 RX ADMIN — ONDANSETRON 8 MG: 8 TABLET, ORALLY DISINTEGRATING ORAL at 06:02

## 2025-02-10 RX ADMIN — SUCCINYLCHOLINE CHLORIDE 100 MG: 20 INJECTION, SOLUTION INTRAMUSCULAR; INTRAVENOUS at 03:02

## 2025-02-10 RX ADMIN — SODIUM CHLORIDE, SODIUM LACTATE, POTASSIUM CHLORIDE, AND CALCIUM CHLORIDE: .6; .31; .03; .02 INJECTION, SOLUTION INTRAVENOUS at 04:02

## 2025-02-10 RX ADMIN — KETOROLAC TROMETHAMINE 30 MG: 30 INJECTION, SOLUTION INTRAMUSCULAR at 04:02

## 2025-02-10 RX ADMIN — SODIUM CHLORIDE, SODIUM LACTATE, POTASSIUM CHLORIDE, AND CALCIUM CHLORIDE: .6; .31; .03; .02 INJECTION, SOLUTION INTRAVENOUS at 03:02

## 2025-02-10 RX ADMIN — HYDROMORPHONE HYDROCHLORIDE 1 MG: 2 INJECTION, SOLUTION INTRAMUSCULAR; INTRAVENOUS; SUBCUTANEOUS at 04:02

## 2025-02-10 RX ADMIN — LIDOCAINE HYDROCHLORIDE 50 MG: 20 INJECTION, SOLUTION EPIDURAL; INFILTRATION; INTRACAUDAL; PERINEURAL at 03:02

## 2025-02-10 RX ADMIN — DIPHENHYDRAMINE HYDROCHLORIDE 25 MG: 50 INJECTION INTRAMUSCULAR; INTRAVENOUS at 06:02

## 2025-02-10 RX ADMIN — HYDROMORPHONE HYDROCHLORIDE 2 MG: 2 INJECTION, SOLUTION INTRAMUSCULAR; INTRAVENOUS; SUBCUTANEOUS at 02:02

## 2025-02-10 RX ADMIN — FENTANYL CITRATE 100 MCG: 0.05 INJECTION, SOLUTION INTRAMUSCULAR; INTRAVENOUS at 03:02

## 2025-02-10 RX ADMIN — ROCURONIUM BROMIDE 50 MG: 10 INJECTION, SOLUTION INTRAVENOUS at 03:02

## 2025-02-10 RX ADMIN — PROPOFOL 140 MG: 10 INJECTION, EMULSION INTRAVENOUS at 03:02

## 2025-02-10 RX ADMIN — ONDANSETRON 4 MG: 2 INJECTION INTRAMUSCULAR; INTRAVENOUS at 03:02

## 2025-02-10 NOTE — H&P
[facility]  Obstetrics & Gynecology  History & Physical    Patient Name: Xuan Bertrand  MRN: [id number]  Admission Date: (Not on file)  Primary Care Provider: Adilene Barker MD    Subjective:     Chief Complaint/Reason for Admission: pelvic pain     History of Present Illness:  Patient is a 29 yo  presenting with acute onset abdominal pain earlier today. She underwent a transvaginal ultrasound that showed an ovarian torsion.         OB History    Para Term  AB Living   1 1 1 0 0 1   SAB IAB Ectopic Multiple Live Births   0 0 0 0 1      # Outcome Date GA Lbr Holden/2nd Weight Sex Type Anes PTL Lv   1 Term 24 39w5d  3.459 kg (7 lb 10 oz) M CS-LTranv EPI N RUPAL      Complications: Fetal Intolerance, Intraamniotic Infection, Failure to Progress in First Stage      Name: MEGHAN BERTRAND      Apgar1: 4  Apgar5: 8     Past Medical History:   Diagnosis Date    ADHD (attention deficit hyperactivity disorder), combined type 2018    Jasper Goldberg 81    Adolescent idiopathic scoliosis of thoracolumbar region     Agoraphobia with panic attacks     Anxiety hyperventilation     Chronic seasonal allergic rhinitis     COVID-19 10/24/2022    Hx of infectious mononucleosis     Obsessive compulsive disorder     PCR DNA positive for HSV1      Past Surgical History:   Procedure Laterality Date     SECTION N/A 2024    Procedure: PRIMARY  SECTION;  Surgeon: Estella Pino MD;  Location: [facility];  Service: OB/GYN;  Laterality: N/A;       No medications prior to admission.       Review of patient's allergies indicates:  No Known Allergies     Family History       Problem Relation (Age of Onset)    ADD / ADHD Mother    Anxiety disorder Mother    Arthritis Maternal Grandmother    Blindness Father    Cancer Maternal Grandfather    Endometriosis Mother    Heart attack Maternal Uncle, Maternal Grandfather    Heart disease Maternal Grandmother    Hypertension Mother     Metabolic syndrome Mother    Miscarriages / Stillbirths Mother    Obesity Mother    Polycystic ovary syndrome Mother    Stroke Maternal Uncle          Tobacco Use    Smoking status: Never    Smokeless tobacco: Never   Substance and Sexual Activity    Alcohol use: Not Currently     Comment: once every 2 years    Drug use: Never    Sexual activity: Yes     Partners: Male     Birth control/protection: None     Review of Systems   Constitutional:  Negative for appetite change, chills, fever and unexpected weight change.   Eyes:  Negative for visual disturbance.   Respiratory:  Negative for shortness of breath.    Cardiovascular:  Negative for chest pain.   Gastrointestinal:  Positive for abdominal pain. Negative for constipation, diarrhea, nausea and vomiting.   Genitourinary:  Negative for dysmenorrhea, dyspareunia, pelvic pain, vaginal bleeding, vaginal discharge, postcoital bleeding and vaginal odor.   Musculoskeletal:  Negative for back pain.   Integumentary:  Negative for acne.   Neurological:  Negative for syncope and headaches.   Psychiatric/Behavioral:  Negative for depression. The patient is not nervous/anxious.       Objective:     Vital Signs (Most Recent):  Temp: 97.8 °F (36.6 °C) (02/10/25 1437)  Pulse: 78 (02/10/25 1437)  Resp: 16 (02/10/25 1437)  BP: 121/62 (02/10/25 1449)  SpO2: 98 % (02/10/25 1437) Vital Signs (24h Range):  Temp:  [97.8 °F (36.6 °C)] 97.8 °F (36.6 °C)  Pulse:  [78] 78  Resp:  [16] 16  SpO2:  [98 %] 98 %  BP: (121)/(62) 121/62        There is no height or weight on file to calculate BMI.    No LMP recorded.     Physical Exam:   Constitutional: She is oriented to person, place, and time. She appears well-developed and well-nourished. She appears distressed.        Pulmonary/Chest: Effort normal. No respiratory distress.                      Neurological: She is alert and oriented to person, place, and time.     Psychiatric: She has a normal mood and affect. Her behavior is normal.  Judgment and thought content normal.        Laboratory:  Recent Lab Results       None          I have personallly reviewed all pertinent lab results from the last 24 hours.    Diagnostic Results:  US: Reviewed  Assessment/Plan:     Renal/  * Ovarian torsion  - proceed to OR   - consent signed r/b/a/I reviewed        Corinne Carpio MD  Obstetrics & Gynecology  [facility]

## 2025-02-10 NOTE — OP NOTE
Date: 02/10/2025    Procedure: diagnostic laparoscopy, right oophorectomy, right salpingectomy     Surgeon: Corinne Carpio MD    Assistant: Abel Wynn MD    Pre-op Dx:   Ovarian cyst     Post-op Dx:   same  2.  Right dermoid cyst     Anesthesia: GETA    EBL: < 5 cc    IVFs: per anesthesia    DVT prophylaxis: Bilateral sequential compression devices    Specimen: right ovarian cyst, right fallopian tube     Operative Findings: Large right sided cyst, with both cystic and solid appearing components, right hydrosalpinx    Complications: none    Brief HPI: Patient presented with acute onset right sided pelvic pain. Transvaginal ultrasound showed a large right ovarian cyst with no blood flow.       PROCEDURE IN DETAIL:   The patient was  transferred to the Operating Room, where general endotracheal anesthesia was administered without complication. She was placed in the supine position. The patient's abdomen and perineum were prepped in the normal sterile fashion. A De La Cruz catheter was inserted into the bladder. The patient was draped.  A 5 mm skin incision was made in the patient's infraumbilical region. Veress needle was inserted into this location with proper intraperitoneal placement, confirmed with the saline drop test and low intraabdominal pressure.  The abdomen was   insufflated. The 11 mm infraumbilical trocar was inserted under direct laparoscopic visualization. Next, one 5 mm port was placed in the right lower quadrant and a 10 mm skin incisions were made in the patient's lower lateral abdomen respectively.. Trocars were placed in the direct laparoscopic visualization. The patient was placed in steep Trendelenburg position. The abdomen and pelvis were surveyed with the above noted findings.  The ovary was de-torsed but there was minimal normal appearing ovarian tissue. The decision was made to remove the ovary and tube. The voyant was used to ligate the IP ligament with adequeate hemostasis.  The umbilical incision was extended and a 12 mm Endocatch bag was used to remove the cyst and fallopian tube. The fascia was closed at the umbilical port. Next, the ports were removed under direct laparoscopic visualization with excellent hemostasis noted. Skin incisions were closed using a #3-0 Vicryl in a running fashion.  Dermabond was placed on the skin incision. All instruments were removed. 10 cc of 1% marcaine was injected at port sites. Sponge and instrument count was correct x2. The patient tolerated the procedure well and was transferred to recovery in stable condition.

## 2025-02-10 NOTE — SUBJECTIVE & OBJECTIVE
OB History    Para Term  AB Living   1 1 1 0 0 1   SAB IAB Ectopic Multiple Live Births   0 0 0 0 1      # Outcome Date GA Lbr Holden/2nd Weight Sex Type Anes PTL Lv   1 Term 24 39w5d  3.459 kg (7 lb 10 oz) M CS-LTranv EPI N RUPAL      Complications: Fetal Intolerance, Intraamniotic Infection, Failure to Progress in First Stage      Name: MEGHAN BERTRAND      Apgar1: 4  Apgar5: 8     Past Medical History:   Diagnosis Date    ADHD (attention deficit hyperactivity disorder), combined type 2018    Jasper Goldberg 81    Adolescent idiopathic scoliosis of thoracolumbar region     Agoraphobia with panic attacks     Anxiety hyperventilation     Chronic seasonal allergic rhinitis     COVID-19 10/24/2022    Hx of infectious mononucleosis     Obsessive compulsive disorder     PCR DNA positive for HSV1      Past Surgical History:   Procedure Laterality Date     SECTION N/A 2024    Procedure: PRIMARY  SECTION;  Surgeon: Estella Pino MD;  Location: [facility];  Service: OB/GYN;  Laterality: N/A;       No medications prior to admission.       Review of patient's allergies indicates:  No Known Allergies     Family History       Problem Relation (Age of Onset)    ADD / ADHD Mother    Anxiety disorder Mother    Arthritis Maternal Grandmother    Blindness Father    Cancer Maternal Grandfather    Endometriosis Mother    Heart attack Maternal Uncle, Maternal Grandfather    Heart disease Maternal Grandmother    Hypertension Mother    Metabolic syndrome Mother    Miscarriages / Stillbirths Mother    Obesity Mother    Polycystic ovary syndrome Mother    Stroke Maternal Uncle          Tobacco Use    Smoking status: Never    Smokeless tobacco: Never   Substance and Sexual Activity    Alcohol use: Not Currently     Comment: once every 2 years    Drug use: Never    Sexual activity: Yes     Partners: Male     Birth control/protection: None     Review of Systems   Constitutional:  Negative  for appetite change, chills, fever and unexpected weight change.   Eyes:  Negative for visual disturbance.   Respiratory:  Negative for shortness of breath.    Cardiovascular:  Negative for chest pain.   Gastrointestinal:  Positive for abdominal pain. Negative for constipation, diarrhea, nausea and vomiting.   Genitourinary:  Negative for dysmenorrhea, dyspareunia, pelvic pain, vaginal bleeding, vaginal discharge, postcoital bleeding and vaginal odor.   Musculoskeletal:  Negative for back pain.   Integumentary:  Negative for acne.   Neurological:  Negative for syncope and headaches.   Psychiatric/Behavioral:  Negative for depression. The patient is not nervous/anxious.       Objective:     Vital Signs (Most Recent):  Temp: 97.8 °F (36.6 °C) (02/10/25 1437)  Pulse: 78 (02/10/25 1437)  Resp: 16 (02/10/25 1437)  BP: 121/62 (02/10/25 1449)  SpO2: 98 % (02/10/25 1437) Vital Signs (24h Range):  Temp:  [97.8 °F (36.6 °C)] 97.8 °F (36.6 °C)  Pulse:  [78] 78  Resp:  [16] 16  SpO2:  [98 %] 98 %  BP: (121)/(62) 121/62        There is no height or weight on file to calculate BMI.    No LMP recorded.     Physical Exam:   Constitutional: She is oriented to person, place, and time. She appears well-developed and well-nourished. She appears distressed.        Pulmonary/Chest: Effort normal. No respiratory distress.                      Neurological: She is alert and oriented to person, place, and time.     Psychiatric: She has a normal mood and affect. Her behavior is normal. Judgment and thought content normal.        Laboratory:  Recent Lab Results       None          I have personallly reviewed all pertinent lab results from the last 24 hours.    Diagnostic Results:  US: Reviewed

## 2025-02-10 NOTE — ANESTHESIA PREPROCEDURE EVALUATION
"                                                                                                             02/10/2025  Xuan Quintanilla is a 30 y.o., female.    No results for input(s): "WBC", "RBC", "HGB", "HCT", "PLT", "MCV", "MCH", "MCHC" in the last 24 hours.    Past Medical History:   Diagnosis Date    ADHD (attention deficit hyperactivity disorder), combined type 2018    Jasper Goldberg 81    Adolescent idiopathic scoliosis of thoracolumbar region     Agoraphobia with panic attacks     Anxiety hyperventilation     Chronic seasonal allergic rhinitis     COVID-19 10/24/2022    Hx of infectious mononucleosis     Obsessive compulsive disorder     PCR DNA positive for HSV1      Past Surgical History:   Procedure Laterality Date     SECTION N/A 2024    Procedure: PRIMARY  SECTION;  Surgeon: Estella Pino MD;  Location: [facility];  Service: OB/GYN;  Laterality: N/A;       Pre-op Assessment    I have reviewed the Patient Summary Reports.     I have reviewed the Nursing Notes. I have reviewed the NPO Status.   I have reviewed the Medications.     Review of Systems  Anesthesia Hx:  No problems with previous Anesthesia   History of prior surgery of interest to airway management or planning:            Denies Personal Hx of Anesthesia complications.                    Social:  Non-Smoker, No Alcohol Use       Hematology/Oncology:  Hematology Normal   Oncology Normal                                   EENT/Dental:  EENT/Dental Normal           Cardiovascular:  Cardiovascular Normal Exercise tolerance: good                                             Renal/:  Renal/ Normal                 Hepatic/GI:  Hepatic/GI Normal                    Musculoskeletal:  Musculoskeletal Normal                Neurological:  Neurology Normal                                      Endocrine:  Endocrine Normal            Dermatological:  Skin Normal    Psych:  Psychiatric History                  Physical " Exam  General: Well nourished and Cooperative    Airway:  Mallampati: II   Mouth Opening: Normal  TM Distance: Normal  Tongue: Normal  Neck ROM: Normal ROM    Dental:  Intact        Anesthesia Plan  Type of Anesthesia, risks & benefits discussed:    Anesthesia Type: Gen ETT  Intra-op Monitoring Plan: Standard ASA Monitors  Post Op Pain Control Plan: multimodal analgesia  Airway Plan: Video  Informed Consent: Informed consent signed with the Patient and all parties understand the risks and agree with anesthesia plan.  All questions answered. Patient consented to blood products? Yes  ASA Score: 2 Emergent  Day of Surgery Review of History & Physical: H&P Update referred to the surgeon/provider.I have interviewed and examined the patient. I have reviewed the patient's H&P dated: 2/10/25. There are no significant changes.     Ready For Surgery From Anesthesia Perspective.     .

## 2025-02-10 NOTE — DISCHARGE SUMMARY
[facility] [place]  Obstetrics & Gynecology  Discharge Summary    Patient Name: Xuan Quintanilla  MRN: [id number]  Admission Date: 2/10/2025  Hospital Length of Stay: 0 days  Discharge Date and Time:  02/10/2025 4:49 PM  Attending Physician: Estella Pino, *   Discharging Provider: Corinne Carpio MD  Primary Care Provider: Adilene Barker MD    HPI:  Patient is a 31 yo  presenting with acute onset abdominal pain earlier today. She underwent a transvaginal ultrasound that showed an ovarian torsion.     Hospital Course:  Patient presetned with an ovarian torsion. She was taken to the OR. Please see the op note for details. She will be discharged to home.     Goals of Care Treatment Preferences:  Code Status: Full Code      Procedure(s) (LRB):  LAPAROSCOPY, DIAGNOSTIC (N/A)         Significant Diagnostic Studies: N/A      Pending Diagnostic Studies:       Procedure Component Value Units Date/Time    Specimen to Pathology, Surgery Gynecology and Obstetrics [[id number]] Collected: 02/10/25 1627    Order Status: Sent Lab Status: In process Updated: 02/10/25 1627    Specimen: Tissue           Final Active Diagnoses:    Diagnosis Date Noted POA    PRINCIPAL PROBLEM:  Ovarian torsion [N83.519] 02/10/2025 Yes      Problems Resolved During this Admission:        Discharged Condition: good    Disposition:     Follow Up:    Patient Instructions:   No discharge procedures on file.  Medications:  Reconciled Home Medications:      Medication List        ASK your doctor about these medications      scopolamine 1.3-1.5 mg (1 mg over 3 days)  Commonly known as: TRANSDERM-SCOP  Place 1 patch onto the skin every 72 hours.              Corinne Carpio MD  Obstetrics & Gynecology  [facility] [place]

## 2025-02-10 NOTE — TRANSFER OF CARE
Anesthesia Transfer of Care Note    Patient: Xuan Quintanilla    Procedure(s) Performed: Procedure(s) (LRB):  LAPAROSCOPY, DIAGNOSTIC (N/A)    Patient location: PACU    Anesthesia Type: general    Transport from OR: Transported from OR on 6-10 L/min O2 by face mask with adequate spontaneous ventilation    Post pain: adequate analgesia    Post assessment: no apparent anesthetic complications and tolerated procedure well    Post vital signs: stable    Level of consciousness: sedated    Nausea/Vomiting: no nausea/vomiting    Complications: none    Transfer of care protocol was followed      Last vitals: Visit Vitals  BP (!) 92/51   Pulse 83   Temp 36.6 °C (97.9 °F)   Resp 14   SpO2 (!) 94%   Breastfeeding Yes

## 2025-02-10 NOTE — HPI
Patient is a 29 yo  presenting with acute onset abdominal pain earlier today. She underwent a transvaginal ultrasound that showed an ovarian torsion.

## 2025-02-10 NOTE — HOSPITAL COURSE
Patient presetned with an ovarian torsion. She was taken to the OR. Please see the op note for details. She will be discharged to home.

## 2025-02-11 NOTE — PROGRESS NOTES
Pt doing well. AAO. Incision WNL. Pain tolerable. Reported some itching about 20 minutes ago. Benadryl given and itching has since improved. DC instructions discussed with pt and , stated understanding. IV removed and site wrapped

## 2025-02-11 NOTE — PROGRESS NOTES
Pt got nauseous when sitting up for the first time. SL zofran given. Nausea is relieved and pt reports she is ready to go home.

## 2025-02-11 NOTE — ANESTHESIA POSTPROCEDURE EVALUATION
Anesthesia Post Evaluation    Patient: Xuan Quintanilla    Procedure(s) Performed: Procedure(s) (LRB):  LAPAROSCOPY, DIAGNOSTIC (N/A)    Final Anesthesia Type: general      Patient location during evaluation: PACU  Patient participation: Yes- Able to Participate  Level of consciousness: awake and alert, oriented and awake  Post-procedure vital signs: reviewed and stable  Pain management: adequate  Airway patency: patent    PONV status at discharge: No PONV  Anesthetic complications: no      Cardiovascular status: blood pressure returned to baseline, hemodynamically stable and stable  Respiratory status: unassisted, spontaneous ventilation and room air  Hydration status: euvolemic  Follow-up not needed.              Vitals Value Taken Time   BP 96/54 02/10/25 1754   Temp 36.6 °C (97.9 °F) 02/10/25 1645   Pulse 76 02/10/25 1754   Resp 14 02/10/25 1754   SpO2 97 % 02/10/25 1754         No case tracking events are documented in the log.      Pain/Joe Score: Joe Score: 10 (2/10/2025  4:59 PM)

## 2025-02-13 ENCOUNTER — TELEPHONE (OUTPATIENT)
Dept: OBSTETRICS AND GYNECOLOGY | Facility: CLINIC | Age: 30
End: 2025-02-13
Payer: COMMERCIAL

## 2025-02-13 ENCOUNTER — PATIENT MESSAGE (OUTPATIENT)
Dept: OBSTETRICS AND GYNECOLOGY | Facility: CLINIC | Age: 30
End: 2025-02-13
Payer: COMMERCIAL

## 2025-02-13 NOTE — TELEPHONE ENCOUNTER
Pt is scheduled for post op appt on 2/26/25. Pt desires to know if she needs to wait to return to work until after she is seen or if she can return prior. Pt is scheduled to work on Sunday 2/23/25. Please advise. Thank you.

## 2025-02-13 NOTE — TELEPHONE ENCOUNTER
----- Message from Jesusita sent at 2/13/2025  2:34 PM CST -----  Contact: Self  Xuan Quintanilla  MRN: [id number]  Home Phone      [phone]  Work Phone      Not on file.  Mobile          [phone]    Patient Care Team:  Adilene Barker MD as PCP - General (Internal Medicine)  Gokul Mccrary NP as Nurse Practitioner (Obstetrics and Gynecology)  Estella Pino MD as Consulting Physician (Obstetrics and Gynecology)  Abel Wynn MD as Consulting Physician (Obstetrics and Gynecology)  Stacey Ortega LPN as Care Coordinator  OB? No  What phone number can you be reached at? [phone]  Message: had emergency surgery on Monday - needs 2 wk f/u scheduled

## 2025-02-14 LAB
FINAL PATHOLOGIC DIAGNOSIS: NORMAL
GROSS: NORMAL
Lab: NORMAL

## 2025-02-17 VITALS
SYSTOLIC BLOOD PRESSURE: 100 MMHG | DIASTOLIC BLOOD PRESSURE: 55 MMHG | HEART RATE: 64 BPM | OXYGEN SATURATION: 100 % | TEMPERATURE: 98 F | RESPIRATION RATE: 12 BRPM

## 2025-02-26 ENCOUNTER — OFFICE VISIT (OUTPATIENT)
Dept: OBSTETRICS AND GYNECOLOGY | Facility: CLINIC | Age: 30
End: 2025-02-26
Payer: COMMERCIAL

## 2025-02-26 VITALS
SYSTOLIC BLOOD PRESSURE: 128 MMHG | DIASTOLIC BLOOD PRESSURE: 78 MMHG | HEART RATE: 77 BPM | WEIGHT: 178.38 LBS | BODY MASS INDEX: 32.82 KG/M2 | HEIGHT: 62 IN

## 2025-02-26 DIAGNOSIS — D27.9 DERMOID CYST OF OVARY, UNSPECIFIED LATERALITY: Primary | ICD-10-CM

## 2025-02-26 PROCEDURE — 99024 POSTOP FOLLOW-UP VISIT: CPT | Mod: S$GLB,,, | Performed by: STUDENT IN AN ORGANIZED HEALTH CARE EDUCATION/TRAINING PROGRAM

## 2025-02-26 PROCEDURE — 99999 PR PBB SHADOW E&M-EST. PATIENT-LVL II: CPT | Mod: PBBFAC,,, | Performed by: STUDENT IN AN ORGANIZED HEALTH CARE EDUCATION/TRAINING PROGRAM

## 2025-02-26 PROCEDURE — 1160F RVW MEDS BY RX/DR IN RCRD: CPT | Mod: CPTII,S$GLB,, | Performed by: STUDENT IN AN ORGANIZED HEALTH CARE EDUCATION/TRAINING PROGRAM

## 2025-02-26 PROCEDURE — 3074F SYST BP LT 130 MM HG: CPT | Mod: CPTII,S$GLB,, | Performed by: STUDENT IN AN ORGANIZED HEALTH CARE EDUCATION/TRAINING PROGRAM

## 2025-02-26 PROCEDURE — 1159F MED LIST DOCD IN RCRD: CPT | Mod: CPTII,S$GLB,, | Performed by: STUDENT IN AN ORGANIZED HEALTH CARE EDUCATION/TRAINING PROGRAM

## 2025-02-26 PROCEDURE — 3078F DIAST BP <80 MM HG: CPT | Mod: CPTII,S$GLB,, | Performed by: STUDENT IN AN ORGANIZED HEALTH CARE EDUCATION/TRAINING PROGRAM

## 2025-02-26 NOTE — PROGRESS NOTES
"Obstetrics and Gynecology  Post-operative Progress Note    Chief Complaint   Patient presents with    Post-op Evaluation     2 week post-op (surgery 2/10/2025)       Xuan Quintanilla is a 30 y.o. female  post-op from a diagnostic laparoscopy, right oophorectomy due to dermoid cyst on 2/10/25.  Patient is Doing well postoperatively.  She is feeling some "pulling" and dull pain at the incision site in her umbilicus and on the left side.    The pathology revealed:  Final Pathologic Diagnosis 1. Right fallopian tubes and ovaries, salpingo-oophorectomy:  Mature cystic teratoma with features suggestive of ovarian torsion  Fallopian tube with no diagnostic histopathologic abnormalities  Negative for malignancy       Past Medical History:   Diagnosis Date    ADHD (attention deficit hyperactivity disorder), combined type 2018    Jasper Goldberg 81    Adolescent idiopathic scoliosis of thoracolumbar region     Agoraphobia with panic attacks     Anxiety hyperventilation     Chronic seasonal allergic rhinitis     COVID-19 10/24/2022    Hx of infectious mononucleosis     Obsessive compulsive disorder     PCR DNA positive for HSV1     PONV (postoperative nausea and vomiting)      Past Surgical History:   Procedure Laterality Date     SECTION N/A 2024    Procedure: PRIMARY  SECTION;  Surgeon: Estella Pino MD;  Location: [facility] OR;  Service: OB/GYN;  Laterality: N/A;    OOPHORECTOMY Right     right salpingectomy       Family History   Problem Relation Name Age of Onset    Heart disease Maternal Grandmother      Arthritis Maternal Grandmother      Cancer Maternal Grandfather      Heart attack Maternal Grandfather      Blindness Father          right eye    Hypertension Mother      Miscarriages / Stillbirths Mother      Obesity Mother      Anxiety disorder Mother      Metabolic syndrome Mother      ADD / ADHD Mother      Polycystic ovary syndrome Mother      Endometriosis Mother      Heart " "attack Maternal Uncle      Stroke Maternal Uncle      Ovarian cancer Paternal Aunt      Breast cancer Neg Hx      Colon cancer Neg Hx      Diabetes Neg Hx       Social History[1]  OB History    Para Term  AB Living   1 1 1   1   SAB IAB Ectopic Multiple Live Births      0 1      # Outcome Date GA Lbr Holden/2nd Weight Sex Type Anes PTL Lv   1 Term 24 39w5d  3.459 kg (7 lb 10 oz) M CS-LTranv EPI N RUPAL      Complications: Fetal Intolerance, Intraamniotic Infection, Failure to Progress in First Stage       /78   Pulse 77   Ht 5' 2" (1.575 m)   Wt 80.9 kg (178 lb 5.6 oz)   LMP 2025 (Exact Date)   Breastfeeding Yes   BMI 32.62 kg/m²     ROS:  GENERAL: No fever, chills, fatigability or weight loss.  VULVAR: No pain, no lesions and no itching.  VAGINAL: No relaxation, no itching, no discharge, no abnormal bleeding and no lesions.  ABDOMEN: No abdominal pain. Denies nausea. Denies vomiting. No diarrhea. No constipation  BREAST: Denies pain. No lumps. No discharge.  URINARY: No incontinence, no nocturia, no frequency and no dysuria.  CARDIOVASCULAR: No chest pain. No shortness of breath. No leg cramps.  NEUROLOGICAL: No headaches. No vision changes.    PE:   /78   Pulse 77   Ht 5' 2" (1.575 m)   Wt 80.9 kg (178 lb 5.6 oz)   LMP 2025 (Exact Date)   Breastfeeding Yes   BMI 32.62 kg/m²   General appearance: alert, appears stated age, and cooperative  Abdomen: soft, non-tender, incisions clean dry and intace  Neurologic: Mental status: Alert, oriented, thought content appropriate      ASSESSMENT:    1. Dermoid cyst of ovary, unspecified laterality             PLAN:  - patient can return to normal activity        [1]   Social History  Tobacco Use    Smoking status: Never    Smokeless tobacco: Never   Substance Use Topics    Alcohol use: Not Currently    Drug use: Never     "

## 2025-03-03 ENCOUNTER — PATIENT MESSAGE (OUTPATIENT)
Dept: OBSTETRICS AND GYNECOLOGY | Facility: CLINIC | Age: 30
End: 2025-03-03
Payer: COMMERCIAL

## 2025-04-17 ENCOUNTER — LAB VISIT (OUTPATIENT)
Dept: LAB | Facility: HOSPITAL | Age: 30
End: 2025-04-17
Payer: COMMERCIAL

## 2025-04-17 ENCOUNTER — RESULTS FOLLOW-UP (OUTPATIENT)
Dept: OBSTETRICS AND GYNECOLOGY | Facility: HOSPITAL | Age: 30
End: 2025-04-17
Payer: COMMERCIAL

## 2025-04-17 DIAGNOSIS — R35.0 URINARY FREQUENCY: Primary | ICD-10-CM

## 2025-04-17 DIAGNOSIS — N30.01 ACUTE CYSTITIS WITH HEMATURIA: Primary | ICD-10-CM

## 2025-04-17 LAB
BACTERIA #/AREA URNS HPF: ABNORMAL /HPF
BILIRUB UR QL STRIP.AUTO: NEGATIVE
CLARITY UR: ABNORMAL
COLOR UR AUTO: YELLOW
GLUCOSE UR QL STRIP: NEGATIVE
HGB UR QL STRIP: ABNORMAL
HOLD SPECIMEN: NORMAL
HYALINE CASTS #/AREA URNS LPF: 0 /LPF (ref 0–1)
KETONES UR QL STRIP: NEGATIVE
LEUKOCYTE ESTERASE UR QL STRIP: ABNORMAL
MICROSCOPIC COMMENT: ABNORMAL
NITRITE UR QL STRIP: NEGATIVE
PH UR STRIP: 6 [PH]
PROT UR QL STRIP: ABNORMAL
RBC #/AREA URNS HPF: 6 /HPF (ref 0–4)
SP GR UR STRIP: 1.02
SQUAMOUS #/AREA URNS HPF: 1 /HPF
UROBILINOGEN UR STRIP-ACNC: NEGATIVE EU/DL
WBC #/AREA URNS HPF: >100 /HPF (ref 0–5)

## 2025-04-17 PROCEDURE — 81003 URINALYSIS AUTO W/O SCOPE: CPT

## 2025-04-17 PROCEDURE — 87086 URINE CULTURE/COLONY COUNT: CPT

## 2025-04-17 RX ORDER — NITROFURANTOIN 25; 75 MG/1; MG/1
100 CAPSULE ORAL 2 TIMES DAILY
Qty: 14 CAPSULE | Refills: 0 | Status: SHIPPED | OUTPATIENT
Start: 2025-04-17

## 2025-04-19 LAB — BACTERIA UR CULT: NORMAL

## 2025-06-17 ENCOUNTER — OFFICE VISIT (OUTPATIENT)
Dept: INTERNAL MEDICINE | Facility: CLINIC | Age: 30
End: 2025-06-17
Payer: COMMERCIAL

## 2025-06-17 VITALS
BODY MASS INDEX: 33.26 KG/M2 | HEART RATE: 75 BPM | OXYGEN SATURATION: 97 % | DIASTOLIC BLOOD PRESSURE: 72 MMHG | SYSTOLIC BLOOD PRESSURE: 125 MMHG | WEIGHT: 180.75 LBS | HEIGHT: 62 IN

## 2025-06-17 DIAGNOSIS — F90.2 ADHD (ATTENTION DEFICIT HYPERACTIVITY DISORDER), COMBINED TYPE: ICD-10-CM

## 2025-06-17 DIAGNOSIS — F42.9 OBSESSIVE-COMPULSIVE DISORDER, UNSPECIFIED TYPE: ICD-10-CM

## 2025-06-17 DIAGNOSIS — F32.A DEPRESSION, UNSPECIFIED DEPRESSION TYPE: Primary | ICD-10-CM

## 2025-06-17 PROCEDURE — 99214 OFFICE O/P EST MOD 30 MIN: CPT | Mod: S$GLB,,, | Performed by: INTERNAL MEDICINE

## 2025-06-17 PROCEDURE — 3008F BODY MASS INDEX DOCD: CPT | Mod: CPTII,S$GLB,, | Performed by: INTERNAL MEDICINE

## 2025-06-17 PROCEDURE — 1159F MED LIST DOCD IN RCRD: CPT | Mod: CPTII,S$GLB,, | Performed by: INTERNAL MEDICINE

## 2025-06-17 PROCEDURE — G2211 COMPLEX E/M VISIT ADD ON: HCPCS | Mod: S$GLB,,, | Performed by: INTERNAL MEDICINE

## 2025-06-17 PROCEDURE — 99999 PR PBB SHADOW E&M-EST. PATIENT-LVL III: CPT | Mod: PBBFAC,,, | Performed by: INTERNAL MEDICINE

## 2025-06-17 PROCEDURE — 1160F RVW MEDS BY RX/DR IN RCRD: CPT | Mod: CPTII,S$GLB,, | Performed by: INTERNAL MEDICINE

## 2025-06-17 PROCEDURE — 3078F DIAST BP <80 MM HG: CPT | Mod: CPTII,S$GLB,, | Performed by: INTERNAL MEDICINE

## 2025-06-17 PROCEDURE — 3074F SYST BP LT 130 MM HG: CPT | Mod: CPTII,S$GLB,, | Performed by: INTERNAL MEDICINE

## 2025-06-17 RX ORDER — SERTRALINE HYDROCHLORIDE 25 MG/1
25 TABLET, FILM COATED ORAL DAILY
COMMUNITY
End: 2025-06-17 | Stop reason: SDUPTHER

## 2025-06-17 RX ORDER — SERTRALINE HYDROCHLORIDE 25 MG/1
25 TABLET, FILM COATED ORAL DAILY
Qty: 90 TABLET | Refills: 3 | Status: SHIPPED | OUTPATIENT
Start: 2025-06-17

## 2025-06-17 NOTE — PROGRESS NOTES
Subjective:       Patient ID: Xuan Quintanilla is a 30 y.o. female.    Chief Complaint: Medication Refill      HPI:  Patient is nown to me and presents for follow up ADHD and anxiety/OCD.      ADHD and anxiety: she was on Zoloft 25mg and Adderall 10mg when established last visit.  With time she knows her Adderall was bigger more jittery so she stopped taking; also stopped taking when she found out she was pregnant.  She does not feel she needs to resume this medication.  She tried buspar at the end of her pregnancy but this was not helpful for her symptoms so stpped taking.      She resumed her Zoloft postpartum but it was causing stomach pains so she really has just been taking on and off since I saw her last.  Over last few months she noted worsening symptoms and she really was having low motivation and fatigue where she did not feel like doing anything.  She resumed consistently 1.5 months ago with great success. She denies side effects at this time.  She would like to continue on this medication.      Tobacco use: denies  EtOH use: no daily use  Illicit drug use: denies      Past Medical History:   Diagnosis Date    ADHD (attention deficit hyperactivity disorder), combined type 03/2018    Jasper Goldberg 81    Adolescent idiopathic scoliosis of thoracolumbar region     Agoraphobia with panic attacks     Anxiety hyperventilation     Chronic seasonal allergic rhinitis     COVID-19 10/24/2022    Hx of infectious mononucleosis     Obsessive compulsive disorder     PCR DNA positive for HSV1     PONV (postoperative nausea and vomiting)        Family History   Problem Relation Name Age of Onset    Heart disease Maternal Grandmother      Arthritis Maternal Grandmother      Cancer Maternal Grandfather      Heart attack Maternal Grandfather      Blindness Father          right eye    Hypertension Mother      Miscarriages / Stillbirths Mother      Obesity Mother      Anxiety disorder Mother      Metabolic syndrome Mother       ADD / ADHD Mother      Polycystic ovary syndrome Mother      Endometriosis Mother      Heart attack Maternal Uncle      Stroke Maternal Uncle      Ovarian cancer Paternal Aunt      Breast cancer Neg Hx      Colon cancer Neg Hx      Diabetes Neg Hx         Social History[1]    Review of Systems   Constitutional:  Negative for activity change, fatigue, fever and unexpected weight change.   HENT:  Negative for congestion, ear pain, hearing loss, rhinorrhea, sore throat and tinnitus.    Eyes:  Negative for pain, redness and visual disturbance.   Respiratory:  Negative for cough, shortness of breath and wheezing.    Cardiovascular:  Negative for chest pain, palpitations and leg swelling.   Gastrointestinal:  Negative for abdominal pain, blood in stool, constipation, diarrhea, nausea and vomiting.   Genitourinary:  Negative for dysuria, frequency, pelvic pain and urgency.   Musculoskeletal:  Negative for back pain, joint swelling and neck pain.   Skin:  Negative for color change, rash and wound.   Neurological:  Negative for dizziness, tremors, weakness, light-headedness and headaches.         Objective:      Physical Exam  Vitals reviewed.   Constitutional:       General: She is not in acute distress.     Appearance: She is well-developed.   HENT:      Head: Normocephalic and atraumatic.      Right Ear: External ear normal.      Left Ear: External ear normal.      Nose: Nose normal.   Eyes:      General:         Right eye: No discharge.         Left eye: No discharge.      Conjunctiva/sclera: Conjunctivae normal.   Neck:      Thyroid: No thyromegaly.   Cardiovascular:      Rate and Rhythm: Normal rate and regular rhythm.   Pulmonary:      Effort: Pulmonary effort is normal. No respiratory distress.   Skin:     General: Skin is warm and dry.   Neurological:      General: No focal deficit present.      Mental Status: She is alert and oriented to person, place, and time.   Psychiatric:         Behavior: Behavior normal.          Thought Content: Thought content normal.         Assessment:       1. Depression, unspecified depression type    2. Obsessive-compulsive disorder, unspecified type    3. ADHD (attention deficit hyperactivity disorder), combined type        Plan:       1. Depression, unspecified depression type  Chronic controlled  Continue Zoloft same dose  -     sertraline (ZOLOFT) 25 MG tablet; Take 1 tablet (25 mg total) by mouth once daily.  Dispense: 90 tablet; Refill: 3  -     CBC Auto Differential; Future; Expected date: 06/17/2025  -     Comprehensive Metabolic Panel; Future; Expected date: 06/17/2025  -     TSH; Future; Expected date: 06/17/2025  -     Lipid Panel; Future; Expected date: 06/17/2025  -     T4, Free; Future; Expected date: 06/17/2025  -     Hemoglobin A1C; Future; Expected date: 06/17/2025    2. Obsessive-compulsive disorder, unspecified type  Chronic controlled  Continue Zoloft same dose  -     sertraline (ZOLOFT) 25 MG tablet; Take 1 tablet (25 mg total) by mouth once daily.  Dispense: 90 tablet; Refill: 3  -     CBC Auto Differential; Future; Expected date: 06/17/2025  -     Comprehensive Metabolic Panel; Future; Expected date: 06/17/2025  -     TSH; Future; Expected date: 06/17/2025  -     Lipid Panel; Future; Expected date: 06/17/2025  -     T4, Free; Future; Expected date: 06/17/2025  -     Hemoglobin A1C; Future; Expected date: 06/17/2025    3. ADHD (attention deficit hyperactivity disorder), combined type  History of  Currently well controlled with SSRI alone  Had side effects of worsening anxiety with stimulant (Adderall) in the past and does not feel she needs to resume this medication at this time  -     sertraline (ZOLOFT) 25 MG tablet; Take 1 tablet (25 mg total) by mouth once daily.  Dispense: 90 tablet; Refill: 3  -     CBC Auto Differential; Future; Expected date: 06/17/2025  -     Comprehensive Metabolic Panel; Future; Expected date: 06/17/2025  -     TSH; Future; Expected date:  06/17/2025  -     Lipid Panel; Future; Expected date: 06/17/2025  -     T4, Free; Future; Expected date: 06/17/2025  -     Hemoglobin A1C; Future; Expected date: 06/17/2025       Return to clinic 1 year and PRN pending lab               [1]   Social History  Socioeconomic History    Marital status:      Spouse name: Alf 2016    Number of children: 0    Highest education level: Some college, no degree   Occupational History    Occupation: Student at [employer], [employer]      Comment: working as Tech in ER    Occupation: Former manager AE 5+ years   Tobacco Use    Smoking status: Never    Smokeless tobacco: Never   Substance and Sexual Activity    Alcohol use: Not Currently    Drug use: Never    Sexual activity: Yes     Partners: Male     Birth control/protection: None

## 2025-08-05 ENCOUNTER — PATIENT MESSAGE (OUTPATIENT)
Dept: INTERNAL MEDICINE | Facility: CLINIC | Age: 30
End: 2025-08-05
Payer: COMMERCIAL

## 2025-08-05 RX ORDER — ACYCLOVIR 400 MG/1
400 TABLET ORAL 3 TIMES DAILY
Qty: 21 TABLET | Refills: 5 | Status: SHIPPED | OUTPATIENT
Start: 2025-08-05 | End: 2025-08-12

## 2025-08-27 ENCOUNTER — PATIENT MESSAGE (OUTPATIENT)
Dept: INTERNAL MEDICINE | Facility: CLINIC | Age: 30
End: 2025-08-27
Payer: COMMERCIAL

## 2025-09-02 RX ORDER — SCOPOLAMINE 1 MG/3D
1 PATCH, EXTENDED RELEASE TRANSDERMAL
Qty: 10 PATCH | Refills: 0 | Status: SHIPPED | OUTPATIENT
Start: 2025-09-02

## (undated) DEVICE — PAD SANITARY OB STERILE

## (undated) DEVICE — SOL WATER STRL IRR 1000ML

## (undated) DEVICE — TRAY CATH FOL SIL URIMTR 16FR

## (undated) DEVICE — PACK C-SECTION CUSTOM

## (undated) DEVICE — TAPE MEDIPORE 3 X 10YD

## (undated) DEVICE — ELECTRODE REM PLYHSV RETURN 9

## (undated) DEVICE — SUT CHROMIC GUT 2-0 CT-1 27IN

## (undated) DEVICE — GOWN POLY REINF BRTH SLV XL

## (undated) DEVICE — DRESSING AQUACEL AG 3.5X10IN

## (undated) DEVICE — DURAPREP SURG SCRUB 26ML

## (undated) DEVICE — SUT 2-0 VICRYL / CT-1

## (undated) DEVICE — SOL NACL IRR 1000ML BTL

## (undated) DEVICE — UNDERPAD PROTECT PLUS 17X24IN

## (undated) DEVICE — SUT 0 36IN PDS II VIO MONO

## (undated) DEVICE — SUT CTD VICRYL 0 UND BR CT

## (undated) DEVICE — SUT 3/0 36IN COATED VICRYL